# Patient Record
Sex: FEMALE | Race: WHITE | URBAN - METROPOLITAN AREA
[De-identification: names, ages, dates, MRNs, and addresses within clinical notes are randomized per-mention and may not be internally consistent; named-entity substitution may affect disease eponyms.]

---

## 2017-05-09 ENCOUNTER — APPOINTMENT (OUTPATIENT)
Dept: GENERAL RADIOLOGY | Age: 37
DRG: 897 | End: 2017-05-09
Attending: FAMILY MEDICINE
Payer: SELF-PAY

## 2017-05-09 ENCOUNTER — HOSPITAL ENCOUNTER (INPATIENT)
Age: 37
LOS: 3 days | Discharge: HOME | DRG: 897 | End: 2017-05-12
Attending: PSYCHIATRY & NEUROLOGY | Admitting: PSYCHIATRY & NEUROLOGY
Payer: SELF-PAY

## 2017-05-09 PROBLEM — F31.9 BIPOLAR DISORDER (HCC): Status: ACTIVE | Noted: 2017-05-09

## 2017-05-09 PROBLEM — F19.20 POLYSUBSTANCE DEPENDENCE (HCC): Status: ACTIVE | Noted: 2017-05-09

## 2017-05-09 PROCEDURE — 74011250637 HC RX REV CODE- 250/637: Performed by: PSYCHIATRY & NEUROLOGY

## 2017-05-09 PROCEDURE — 71010 XR CHEST PORT: CPT

## 2017-05-09 PROCEDURE — HZ2ZZZZ DETOXIFICATION SERVICES FOR SUBSTANCE ABUSE TREATMENT: ICD-10-PCS | Performed by: PSYCHIATRY & NEUROLOGY

## 2017-05-09 PROCEDURE — 65220000003 HC RM SEMIPRIVATE PSYCH

## 2017-05-09 RX ORDER — OLANZAPINE 5 MG/1
5 TABLET ORAL
Status: DISCONTINUED | OUTPATIENT
Start: 2017-05-09 | End: 2017-05-12 | Stop reason: HOSPADM

## 2017-05-09 RX ORDER — LANOLIN ALCOHOL/MO/W.PET/CERES
100 CREAM (GRAM) TOPICAL DAILY
Status: DISCONTINUED | OUTPATIENT
Start: 2017-05-09 | End: 2017-05-12 | Stop reason: HOSPADM

## 2017-05-09 RX ORDER — ACETAMINOPHEN 325 MG/1
650 TABLET ORAL
Status: DISCONTINUED | OUTPATIENT
Start: 2017-05-09 | End: 2017-05-12 | Stop reason: HOSPADM

## 2017-05-09 RX ORDER — METHADONE HYDROCHLORIDE 10 MG/1
30 TABLET ORAL ONCE
Status: DISCONTINUED | OUTPATIENT
Start: 2017-05-09 | End: 2017-05-09 | Stop reason: SDUPTHER

## 2017-05-09 RX ORDER — DICYCLOMINE HYDROCHLORIDE 10 MG/ML
20 INJECTION INTRAMUSCULAR
Status: DISCONTINUED | OUTPATIENT
Start: 2017-05-09 | End: 2017-05-11

## 2017-05-09 RX ORDER — IBUPROFEN 400 MG/1
400 TABLET ORAL
Status: DISCONTINUED | OUTPATIENT
Start: 2017-05-09 | End: 2017-05-12 | Stop reason: HOSPADM

## 2017-05-09 RX ORDER — LORAZEPAM 1 MG/1
1 TABLET ORAL
Status: DISCONTINUED | OUTPATIENT
Start: 2017-05-09 | End: 2017-05-09

## 2017-05-09 RX ORDER — PROMETHAZINE HYDROCHLORIDE 25 MG/1
25 TABLET ORAL
Status: DISCONTINUED | OUTPATIENT
Start: 2017-05-09 | End: 2017-05-11

## 2017-05-09 RX ORDER — IBUPROFEN 200 MG
1 TABLET ORAL
Status: DISCONTINUED | OUTPATIENT
Start: 2017-05-09 | End: 2017-05-12 | Stop reason: HOSPADM

## 2017-05-09 RX ORDER — METHADONE HYDROCHLORIDE 10 MG/1
15 TABLET ORAL DAILY
Status: COMPLETED | OUTPATIENT
Start: 2017-05-10 | End: 2017-05-11

## 2017-05-09 RX ORDER — ADHESIVE BANDAGE
30 BANDAGE TOPICAL DAILY PRN
Status: DISCONTINUED | OUTPATIENT
Start: 2017-05-09 | End: 2017-05-12 | Stop reason: HOSPADM

## 2017-05-09 RX ORDER — METHOCARBAMOL 500 MG/1
500 TABLET, FILM COATED ORAL
Status: DISCONTINUED | OUTPATIENT
Start: 2017-05-09 | End: 2017-05-11

## 2017-05-09 RX ORDER — HYDROXYZINE 50 MG/1
50 TABLET, FILM COATED ORAL
Status: DISCONTINUED | OUTPATIENT
Start: 2017-05-09 | End: 2017-05-12 | Stop reason: HOSPADM

## 2017-05-09 RX ORDER — BENZTROPINE MESYLATE 1 MG/ML
2 INJECTION INTRAMUSCULAR; INTRAVENOUS
Status: DISCONTINUED | OUTPATIENT
Start: 2017-05-09 | End: 2017-05-12 | Stop reason: HOSPADM

## 2017-05-09 RX ORDER — METHADONE HYDROCHLORIDE 10 MG/1
15 TABLET ORAL DAILY
Status: DISCONTINUED | OUTPATIENT
Start: 2017-05-10 | End: 2017-05-09 | Stop reason: SDUPTHER

## 2017-05-09 RX ORDER — LORAZEPAM 2 MG/ML
4 INJECTION INTRAMUSCULAR
Status: DISCONTINUED | OUTPATIENT
Start: 2017-05-09 | End: 2017-05-11

## 2017-05-09 RX ORDER — ZOLPIDEM TARTRATE 10 MG/1
10 TABLET ORAL
Status: DISCONTINUED | OUTPATIENT
Start: 2017-05-09 | End: 2017-05-12 | Stop reason: HOSPADM

## 2017-05-09 RX ORDER — FOLIC ACID 1 MG/1
1 TABLET ORAL DAILY
Status: DISCONTINUED | OUTPATIENT
Start: 2017-05-09 | End: 2017-05-12 | Stop reason: HOSPADM

## 2017-05-09 RX ORDER — ACETAMINOPHEN 325 MG/1
650 TABLET ORAL
Status: DISCONTINUED | OUTPATIENT
Start: 2017-05-09 | End: 2017-05-09

## 2017-05-09 RX ORDER — LORAZEPAM 2 MG/ML
2 INJECTION INTRAMUSCULAR
Status: DISCONTINUED | OUTPATIENT
Start: 2017-05-09 | End: 2017-05-11

## 2017-05-09 RX ORDER — BENZTROPINE MESYLATE 2 MG/1
2 TABLET ORAL
Status: DISCONTINUED | OUTPATIENT
Start: 2017-05-09 | End: 2017-05-12 | Stop reason: HOSPADM

## 2017-05-09 RX ORDER — LORAZEPAM 2 MG/ML
2 INJECTION INTRAMUSCULAR
Status: DISCONTINUED | OUTPATIENT
Start: 2017-05-09 | End: 2017-05-12 | Stop reason: HOSPADM

## 2017-05-09 RX ORDER — METHADONE HYDROCHLORIDE 10 MG/1
30 TABLET ORAL ONCE
Status: COMPLETED | OUTPATIENT
Start: 2017-05-09 | End: 2017-05-09

## 2017-05-09 RX ORDER — LOPERAMIDE HYDROCHLORIDE 2 MG/1
2 CAPSULE ORAL AS NEEDED
Status: DISCONTINUED | OUTPATIENT
Start: 2017-05-09 | End: 2017-05-12 | Stop reason: HOSPADM

## 2017-05-09 RX ADMIN — Medication 100 MG: at 12:59

## 2017-05-09 RX ADMIN — METHADONE HYDROCHLORIDE 30 MG: 10 TABLET ORAL at 12:59

## 2017-05-09 RX ADMIN — FOLIC ACID 1 MG: 1 TABLET ORAL at 12:59

## 2017-05-09 NOTE — IP AVS SNAPSHOT
Current Discharge Medication List  
  
START taking these medications Dose & Instructions Dispensing Information Comments Morning Noon Evening Bedtime  
 hydrOXYzine HCl 50 mg tablet Commonly known as:  ATARAX Your last dose was: Your next dose is:    
   
   
 Dose:  50 mg Take 1 Tab by mouth every four (4) hours as needed (Anxiety) for up to 10 days. Indications: anxiety Quantity:  90 Tab Refills:  9 Where to Get Your Medications Information on where to get these meds will be given to you by the nurse or doctor. ! Ask your nurse or doctor about these medications  
  hydrOXYzine HCl 50 mg tablet

## 2017-05-09 NOTE — BH NOTES
GROUP THERAPY PROGRESS NOTE    Yuri Scruggs did not participated in an Afternoon Process Group on the Acute Unit with a focus identifying feelings, planning for the day, and learning more about DBT concepts on \"Distress Regulation. 

## 2017-05-09 NOTE — BH NOTES
0 staff approaches pt about stat blood work ordered by dr. Ephraim Alvarado states in a slightly irritable tone \" I don't want to be poked and stuck again! They got all the blood work they need from [facility]". Pt is refusing to have lab work drawn.

## 2017-05-09 NOTE — IP AVS SNAPSHOT
[address] [facility] 
[phone] Patient: Angeles Vázquez MRN: [id number] LAMIN:[birth year] You are allergic to the following Allergen Reactions Celexa (Citalopram) Unknown (comments) Recent Documentation Breastfeeding? No       
  
  
 
  
  
About your hospitalization You were admitted on:  May 9, 2017 You last received care in the:  [facility] You were discharged on:  May 12, 2017 Unit phone number:  [phone] Why you were hospitalized Your primary diagnosis was:  Bipolar Disorder (Hcc) Your diagnoses also included:  Polysubstance Dependence (Hcc) Providers Seen During Your Hospitalizations Provider Role Specialty Primary office phone Marcel Grubbs MD Attending Provider Psychiatry [phone] Vidhi Johnson MD Attending Provider Psychiatry [phone] Your Primary Care Physician (PCP) Primary Care Physician Office Phone Office Fax UNKNOWN, PROVIDER ** None ** ** None ** Follow-up Information Follow up With Details Comments Contact Info [address] [place] [address]  On 5/15/2017 you need to walk in Monday - Friday 8:30 to 3:00 for intake  [address] [place] [address] 15  
[phone]  
fax [phone] Provider Unknown   Patient not available to ask Current Discharge Medication List  
  
START taking these medications Dose & Instructions Dispensing Information Comments Morning Noon Evening Bedtime  
 hydrOXYzine HCl 50 mg tablet Commonly known as:  ATARAX Your last dose was: Your next dose is:    
   
   
 Dose:  50 mg Take 1 Tab by mouth every four (4) hours as needed (Anxiety) for up to 10 days. Indications: anxiety Quantity:  90 Tab Refills:  9 Where to Get Your Medications Information on where to get these meds will be given to you by the nurse or doctor. ! Ask your nurse or doctor about these medications  
  hydrOXYzine HCl 50 mg tablet Discharge Instructions DISCHARGE SUMMARY 
 
Juan Douglass : 1980 MRN: [id number] The patient Tom Cisneros exhibits the ability to control behavior in a less restrictive environment. Patient's level of functioning is improving. No assaultive/destructive behavior has been observed for the past 24 hours. No suicidal/homicidal threat or behavior has been observed for the past 24 hours. There is no evidence of serious medication side effects. Patient has not been in physical or protective restraints for at least the past 24 hours. If weapons involved, how are they secured? No weapons involved Is patient aware of and in agreement with discharge plan? Patient is aware of discharge and is in agreement Arrangements for medication: . Patient is a smoker and needs referral for smoking cessation? Patient declined 1. Patient referred to the following for smoking cessation with an appointment: Patient declined 2. Patient was offered medication to assist with smoking cessation at discharge: Patient declined 3. Education for smoking cessation added to discharge instructions:  
 
Patient has a history of substance/alcohol abuse and requires a referral for treatment ? yes 1. Patient referred to the following for substance/alcohol abuse treatment with an appointment:   Friday May 15 at 8:30 with [facility] 2. Patient was offered medication to assist with alcohol cessation at discharge:  no 
3. Education for substance/alcohol abuse added to discharge instructions:  
 
Copy of discharge instructions to  provider?:  Yes, faxed to [phone] Arrangements for transportation home:  Hospital purchase a bus ticket Psychiatric Advanced Care Directives- no  
 Name of Decision maker if patient has Psychiatric Care Directive Patient was offered information  -  patient declined information. Keep all follow up appointments as scheduled, continue to take prescribed medications per physician instructions. Mental health crisis number:  842 or your local mental health crisis line number at [phone] Discharge Orders None [employer] Announcement We are excited to announce that we are making your provider's discharge notes available to you in [employer]. You will see these notes when they are completed and signed by the physician that discharged you from your recent hospital stay. If you have any questions or concerns about any information you see in [employer], please call the Health Information Department where you were seen or reach out to your Primary Care Provider for more information about your plan of care. Introducing [facility]! Gracie Bryson introduces [employer] patient portal. Now you can access parts of your medical record, email your doctor's office, and request medication refills online. 1. In your internet browser, go to [email or web address] 2. Click on the First Time User? Click Here link in the Sign In box. You will see the New Member Sign Up page. 3. Enter your [employer] Access Code exactly as it appears below. You will not need to use this code after youve completed the sign-up process. If you do not sign up before the expiration date, you must request a new code. · [employer] Access Code: [email or web address] Expires: 8/7/2017  5:58 AM 
 
4. Enter the last four digits of your Social Security Number (xxxx) and Date of Birth (mm/dd/yyyy) as indicated and click Submit. You will be taken to the next sign-up page. 5. Create a [employer] ID. This will be your [employer] login ID and cannot be changed, so think of one that is secure and easy to remember. 6. Create a [employer] password. You can change your password at any time. 7. Enter your Password Reset Question and Answer. This can be used at a later time if you forget your password. 8. Enter your e-mail address. You will receive e-mail notification when new information is available in [address] 9. Click Sign Up. You can now view and download portions of your medical record. 10. Click the Download Summary menu link to download a portable copy of your medical information. If you have questions, please visit the Frequently Asked Questions section of the [employer] website. Remember, [employer] is NOT to be used for urgent needs. For medical emergencies, dial 911. Now available from your iPhone and Android! General Information Please provide this summary of care documentation to your next provider. Patient Signature:  ____________________________________________________________ Date:  ____________________________________________________________  
  
Yvette Cruz Provider Signature:  ____________________________________________________________ Date:  ____________________________________________________________

## 2017-05-09 NOTE — BH NOTES
PSYCHOSOCIAL ASSESSMENT  :Patient identifying info: Zak Miller is a 29 y.o., female admitted 2017  5:59 AM     Presenting problem and precipitating factors: Patent was admitted due to suicidal ideations and she was abusing substance . Current psychiatric providers and contact info: no current provider   Previous psychiatric services/providers and response to treatment: no previous admissions       Substance abuse history:    Social History   Substance Use Topics    Smoking status: Not on file    Smokeless tobacco: Not on file    Alcohol use Not on file       Family constellation: boyfriend ,   Lost of custody of 3 children ( 2 girls, one son )     Is significant other involved?        Describe support system:     Describe living arrangements and home environment:lives at home with her boyfriend   Health issues:   Hospital Problems  Never Reviewed          Codes Class Noted POA    Bipolar disorder [facility]) ICD-10-CM: F31.9  ICD-9-CM: 296.80  2017 Unknown              Trauma history: none noted     Legal issues: no    History of  service: no    Financial status: not working     Catholic/cultural factors: none noted     Education/work history: some college     Have you been licensed as a philip care professional (current or ): no  Leisure and recreation preferences: unknown  Describe coping skills:ineffectual     Grady Mays  2017

## 2017-05-09 NOTE — PROGRESS NOTES
Admission Medication Reconciliation:    Information obtained from: Patient,     Significant PMH/Disease States:   No past medical history on file. Allergies:  Celexa [citalopram]    Prior to Admission Medications:   None         Comments/Recommendations: Patient was not taking any prescribed medications prior to admission.        Burgess Day, PharmD, [facility], BCPS

## 2017-05-09 NOTE — BH NOTES
TRANSFER - IN REPORT:    Verbal report received from Zara Child (name) on Bula Side  being received from ED, [facility] [place] (unit) for routine progression of care      Report consisted of patients Situation, Background, Assessment and   Recommendations(SBAR). Information from the following report(s) ED Summary was reviewed with the receiving nurse. Opportunity for questions and clarification was provided. Assessment completed upon patients arrival to unit and care assumed.

## 2017-05-09 NOTE — PROGRESS NOTES
[address] [facility] 60  Master Treatment Plan for Kathy Sinclair    Date Treatment Plan Initiated: 5/9/2017    Treatment Plan Modalities:  Type of Modality Amount  (x minutes) Frequency (x/week) Duration (x days) Name of Responsible Staff   Community & wrap-up meetings to encourage peer interactions         Group psychotherapy to assist in building coping skills and internal controls       Therapeutic activity groups to build coping skills       Psychoeducation in group setting to address:   Medication education         Coping skills         Relaxation techniques         Symptom management         Discharge planning         Spirituality          CHRIS         Recovery/AA/NA         Physician medication management         Family meeting/discharge planning                                        Problem: Manic Behavior (Adult/Pediatric)  Goal: *STG: Participates in treatment plan  Outcome: Progressing Towards Goal  Pt. Met with Dr. Vilma Conrad    Goal: *STG: Maintains appropriate boundaries  Outcome: Progressing Towards Goal  Pt. Maintaining Appropriate  boundaries  Goal: *STG/LTG: Complies with medication therapy  Outcome: Progressing Towards Goal  Pt. Medication compliant  Goal: Interventions  Outcome: Progressing Towards Goal  Will continue to monitor  On 15 min.  Checks for safety  Assess suicidal ideation    Medication effectiveness

## 2017-05-09 NOTE — H&P
[address] [facility] [address]   HISTORY AND PHYSICAL       Name:  Mendy Boswell   MR#:  [id number]   :  1980   Account #:  [de-identified]        Date of Adm:  2017       ATTENDING PHYSICIAN: Nicole Clark MD    CHIEF COMPLAINT: Suicidal ideation. HISTORY OF PRESENT ILLNESS: The patient is a 29-year-old   female with past medical history significant for bipolar disorder, manic   depression and anxiety, who presented to the ED in [place] with   complaints of having racing thoughts and severe difficulty focusing. The patient had suicidal ideation, but denied any significant auditory or   visual hallucinations. She states that at night she notices . She also   reports that she is undergoing some issues with the custody of her   kids, and just wanted to harm herself and slit her left wrist. The patient   was evaluated and was transferred to the hospital here to psych zaragoza   for further management and evaluation. Currently, the patient is resting   in bed. Denies any complaints or problems. Denies any suicidal   ideation. Denies any headache, blurry vision, sore throat, trouble   swallowing, trouble with speech, any chest pain, shortness of breath,   cough, fever, chills, abdominal pain, constipation, diarrhea,   hematemesis, melena, hemoptysis, urinary symptoms, focal or   generalized neurological weakness, recent travels or sick contacts. PAST MEDICAL HISTORY: See above. HOME MEDICATIONS: None. SOCIAL HISTORY: The patient has history of drug abuse, was found   to have a urine drug screen positive for marijuana, benzos,   barbiturates, opiates, methamphetamine and cocaine. Denies tobacco   abuse or alcohol use. ALLERGIES TO: Ardie Brunette. FAMILY HISTORY: Was discussed, was found to be noncontributory. PHYSICAL EXAMINATION   VITAL SIGNS: Temperature 97.5, , respiratory rate 16, blood   pressure , pulse oximetry 98% on room air.    GENERAL: Alert x3, flat affect, resting in bed, pleasant female,   appears to be stated age. HEENT: Pupils equal and reactive to light. Dry mucous membranes. There are areas of superficial bruising, but no pain associated with the   same on the face. NECK: Supple. CHEST: Clear to auscultation bilaterally. CORONARY: S1, S2 were heard. ABDOMEN: Soft, nontender, nondistended. Bowel sounds physiologic. EXTREMITIES: No clubbing, no cyanosis, no edema. NEUROPSYCHIATRIC: Flat affect. Moves all 4. Strength 5/5. No gross   focal neurological deficits were noted. LABORATORY DATA: Labs done at [facility] show hemoglobin 13.4, hematocrit 37.3, platelets of 718. UA   with no acute infection. Sodium 136, potassium 3.8, chloride 112,   bicarbonate 21, anion gap 10, glucose 76, BUN 12, creatinine 0.7,   calcium 9.1, albumin 4.1, total bilirubin 1.1, , ALT 12. ASSESSMENT AND PLAN   1. Suicidal ideation. The patient has been admitted to the psych floor,   and will follow along with Psychiatry. Continue to monitor. Plan per   primary. 2. Mild elevation in bilirubin. Will recheck blood levels. The patient   denies any complaints or problems at this point of time. 3. Gastrointestinal and deep venous thrombosis prophylaxis per unit   protocol.         Rodrigue Mcknight MD       / [facility]   D:  05/09/2017   15:43   T:  05/09/2017   16:08   Job #:  [id number]

## 2017-05-09 NOTE — PROGRESS NOTES
Problem: Manic Behavior (Adult/Pediatric)  Goal: *STG: Maintains appropriate boundaries  Outcome: Progressing Towards Goal  Pt. Maintaining appropriate boundaries  Goal: Interventions  Outcome: Progressing Towards Goal  Will continue to monitor  Offer verbal re-direction as indicated    Problem: Chemical Dependency (Adult/Pediatric)  Goal: *STG: Participates in treatment plan  Outcome: Progressing Towards Goal  Pt. Met with Dr. Barry Santana in treatment team  Discussed stressors  Increased use of substances    Meth  Amphetamines  Cocaine  thc    Placed on COW  Score 11  Planning methadone taper  Goal: *STG: Seeks staff when symptoms of withdrawal increase  Outcome: Progressing Towards Goal  Vital signs stable     Within normal limits   Goal: *STG: Attends activities and groups  Variance: Patient Condition  Not participating in groups  Goal: Interventions  Outcome: Progressing Towards Goal  Will continue to monitor  On 15 min.   Checks for safety    Assess  COW  Withdrawal symptoms  Medication effectiveness   Encourage group participaion    [address] [facility] 60  Master Treatment Plan for Bula Side    Date Treatment Plan Initiated: 5/9/2017    Treatment Plan Modalities:  Type of Modality Amount  (x minutes) Frequency (x/week) Duration (x days) Name of Responsible Staff   [address] [place] [address] meetings to encourage peer interactions 15 7 [facility]    Group psychotherapy to assist in building coping skills and internal controls 60 7 [address] LCS[facility]   Therapeutic activity groups to build coping skills 60 7 1 Will KENYONW   Psychoeducation in group setting to address:   Medication education   15  7 1 Placido Bueno RN    Coping skills   20 1 1 Sarita Morales RN   Relaxation techniques         Symptom management   20  1 1 [address]   Discharge planning   15 7 1 [address] [place] [address] Pkwy work   Spirituality          CHRIS         Recovery/AA/NA         Physician medication management   15 1 1 Dr. Vira Hansen

## 2017-05-09 NOTE — IP AVS SNAPSHOT
Summary of Care Report The Summary of Care report has been created to help improve care coordination. Users with access to [employer] or [address] (Web-based application) may access additional patient information including the Discharge Summary. If you are not currently a [address] user and need more information, please call the number listed below in the [place] [address] section and ask to be connected with Medical Records. Facility Information Name Address Phone [phone] [place] [phone] Patient Information Patient Name Sex RAJNI Clifford ([id number]) Female 1980 Discharge Information Admitting Provider Service Area Unit Flako Palmer MD / [phone] [address] / [phone] Discharge Provider Discharge Date/Time Discharge Disposition Destination (none) 2017 (Pending) AHR (none) Patient Language Language ENGLISH [13] Hospital Problems as of 2017  Never Reviewed Class Noted - Resolved Last Modified POA Active Problems * (Principal)Bipolar disorder ([facility] [address]  2017 - Present 2017 by Barry Cates MD Unknown Entered by Flako Palmer MD  
  Polysubstance dependence [facility])  2017 - Present 2017 by Barry Cates MD Unknown Entered by Barry Cates MD  
  
You are allergic to the following Allergen Reactions Celexa (Citalopram) Unknown (comments) Current Discharge Medication List  
  
START taking these medications Dose & Instructions Dispensing Information Comments  
 hydrOXYzine HCl 50 mg tablet Commonly known as:  ATARAX Dose:  50 mg Take 1 Tab by mouth every four (4) hours as needed (Anxiety) for up to 10 days. Indications: anxiety Quantity:  90 Tab Refills:  9 Follow-up Information Follow up With Details Comments Contact Info [facility]  On 5/15/2017 you need to walk in Monday - Friday 8:30 to 3:00 for intake  [address] [facility] [place] , [address]  
[phone]  
fax [phone] Provider Unknown   Patient not available to ask Discharge Instructions DISCHARGE SUMMARY 
 
Danilo Bejarano : 1980 MRN: [id number] The patient Mina Kowalski exhibits the ability to control behavior in a less restrictive environment. Patient's level of functioning is improving. No assaultive/destructive behavior has been observed for the past 24 hours. No suicidal/homicidal threat or behavior has been observed for the past 24 hours. There is no evidence of serious medication side effects. Patient has not been in physical or protective restraints for at least the past 24 hours. If weapons involved, how are they secured? No weapons involved Is patient aware of and in agreement with discharge plan? Patient is aware of discharge and is in agreement Arrangements for medication: . Patient is a smoker and needs referral for smoking cessation? Patient declined 1. Patient referred to the following for smoking cessation with an appointment: Patient declined 2. Patient was offered medication to assist with smoking cessation at discharge: Patient declined 3. Education for smoking cessation added to discharge instructions:  
 
Patient has a history of substance/alcohol abuse and requires a referral for treatment ? yes 1. Patient referred to the following for substance/alcohol abuse treatment with an appointment:   Friday May [facility] 2. Patient was offered medication to assist with alcohol cessation at discharge:  no 
3. Education for substance/alcohol abuse added to discharge instructions:  
 
Copy of discharge instructions to  provider?:  Yes, faxed to [phone] Arrangements for transportation home:  Hospital purchase a bus ticket Psychiatric Advanced Care Directives- no  
Name of Decision maker if patient has Psychiatric Care Directive Patient was offered information  -  patient declined information. Keep all follow up appointments as scheduled, continue to take prescribed medications per physician instructions. Mental health crisis number:  016 or your local mental health crisis line number at [phone] Chart Review Routing History No Routing History on File

## 2017-05-09 NOTE — H&P
INITIAL PSYCHIATRIC EVALUATION         IDENTIFICATION:    Patient Name  Blanca Franks   Date of Birth 1980   [facility] [id number]   Medical Record Number  [id number]      Age  29 y.o. PCP PROVIDER UNKNOWN   Admit date:  5/9/2017    Room Number  730/01  @ [facility]   Date of Service  5/9/2017            HISTORY         REASON FOR HOSPITALIZATION:  CC: \"SI and depression \". Pt admitted under a voluntary basis for severe depression with suicidal ideations  proving to be/posing an imminent danger to self and an inability to care for self. HISTORY OF PRESENT ILLNESS:    The patient, Blanca Franks, is a 29 y.o. UNKNOWN female with a past psychiatric history significant for polysubstance depndence, who presents at this time with complaints of (and/or evidence of) the following emotional symptoms: agitation. Additional symptomatology include feeling suicidal.  The above symptoms have been present for 2 days . These symptoms are of severe severity. These symptoms are constant in nature. The patient's condition has been precipitated by loosing custody or her children and psychosocial stressors (conflicts with the childerns' father ). Patient's condition made worse by continued illicit drug use and alcohol use as well as treatment noncompliance. UDS: +MJ, BZP, BARBS, OPI, METHAMPH, AMPH, Cocaineand PCP BAL=0. ALLERGIES:  Allergies   Allergen Reactions    Celexa [Citalopram] Unknown (comments)      MEDICATIONS PRIOR TO ADMISSION:  No prescriptions prior to admission. PAST MEDICAL HISTORY:  No past medical history on file. No past surgical history on file. SOCIAL HISTORY:    Social History     Social History    Marital status: UNKNOWN     Spouse name: N/A    Number of children: N/A    Years of education: N/A     Occupational History    Not on file.      Social History Main Topics    Smoking status: Not on file    Smokeless tobacco: Not on file    Alcohol use Not on file    Drug use: Not on file    Sexual activity: Not on file     Other Topics Concern    Not on file     Social History Narrative    29year old  female admitted voluntarily for severe depression in the context of polysubstance dependence and loss of custody of her 3 children. Patient and her childrens' father have been fighting the legal system for 4 years now, in order to regain custody of their children, which CPS has removed from their home. The pat. And her boyfriend, have been unable to sustain gainful employment not to abstain from illicit drugs. The Pt's UDS was pan positive for methamphetamines, THC, Opiates, barbiturates, cocaine, and benzodiazepines. Pt reported to the ED after an argument with her chidrens' father, after which she attempted suicide bt cutting he left wrist, requiring sutures. She denies any past hx of psychiatric treatment. FAMILY HISTORY:   No family history on file. REVIEW OF SYSTEMS:   Psychological ROS: positive for - behavioral disorder  Respiratory ROS: no cough, shortness of breath, or wheezing  Cardiovascular ROS: no chest pain or dyspnea on exertion  Pertinent items are noted in the History of Present Illness. All other Systems reviewed and are considered negative. MENTAL STATUS EXAM & VITALS         MENTAL STATUS EXAM (MSE):    MSE FINDINGS ARE WITHIN NORMAL LIMITS (WNL) UNLESS OTHERWISE STATED BELOW. ( ALL OF THE BELOW CATEGORIES OF THE MSE HAVE BEEN REVIEWED (reviewed 5/9/2017) AND UPDATED AS DEEMED APPROPRIATE )  General Presentation age appropriate, evasive and guarded   Orientation oriented to time, place and person   Vital Signs  See below (reviewed 5/9/2017); Vital Signs (BP, Pulse, & Temp) are within normal limits if not listed below.    Gait and Station Stable/steady, no ataxia   Musculoskeletal System No extrapyramidal symptoms (EPS); no abnormal muscular movements or Tardive Dyskinesia (TD); muscle strength and tone are within normal limits   Language No aphasia or dysarthria   Speech:  normal pitch   Thought Processes logical; normal rate of thoughts; poor abstract reasoning/computation   Thought Associations circumstantial   Thought Content free of delusions   Suicidal Ideations Cut left wrist requiring sutures   Homicidal Ideations none   Mood:  irritable   Affect:  constricted   Memory recent  fair   Memory remote:  fair   Concentration/Attention:  distractable   Fund of Knowledge below average   Insight:  poor   Reliability poor   Judgment:  poor            VITALS:     Patient Vitals for the past 24 hrs:   Temp Pulse Resp BP SpO2   05/09/17 1200 97.4 °F (36.3 °C) 84 16 125/85 98 %   05/09/17 0728 98.4 °F (36.9 °C) 81 16 113/78 99 %   05/09/17 0600 98.1 °F (36.7 °C) 93 18 130/89 100 %     Wt Readings from Last 3 Encounters:   No data found for Wt     Temp Readings from Last 3 Encounters:   05/09/17 97.4 °F (36.3 °C)     BP Readings from Last 3 Encounters:   05/09/17 125/85     Pulse Readings from Last 3 Encounters:   05/09/17 84            DATA       LABORATORY DATA:  Labs Reviewed - No data to display  No results found for any previous visit. RADIOLOGY REPORTS:  No results found for this or any previous visit. No results found.            MEDICATIONS       ALL MEDICATIONS  Current Facility-Administered Medications   Medication Dose Route Frequency    ziprasidone (GEODON) 20 mg in sterile water (preservative free) 1 mL injection  20 mg IntraMUSCular BID PRN    OLANZapine (ZyPREXA) tablet 5 mg  5 mg Oral Q6H PRN    benztropine (COGENTIN) tablet 2 mg  2 mg Oral BID PRN    benztropine (COGENTIN) injection 2 mg  2 mg IntraMUSCular BID PRN    LORazepam (ATIVAN) injection 2 mg  2 mg IntraMUSCular Q4H PRN    zolpidem (AMBIEN) tablet 10 mg  10 mg Oral QHS PRN    acetaminophen (TYLENOL) tablet 650 mg  650 mg Oral Q4H PRN    ibuprofen (MOTRIN) tablet 400 mg  400 mg Oral Q8H PRN    magnesium hydroxide (MILK OF MAGNESIA) 400 mg/5 mL oral suspension 30 mL  30 mL Oral DAILY PRN    nicotine (NICODERM CQ) 21 mg/24 hr patch 1 Patch  1 Patch TransDERmal DAILY PRN    folic acid (FOLVITE) tablet 1 mg  1 mg Oral DAILY    thiamine (B-1) tablet 100 mg  100 mg Oral DAILY    LORazepam (ATIVAN) injection 2 mg  2 mg IntraMUSCular Q1H PRN    LORazepam (ATIVAN) injection 4 mg  4 mg IntraMUSCular Q1H PRN    [START ON 5/10/2017] methadone (DOLOPHINE) tablet 15 mg  15 mg Oral DAILY    promethazine (PHENERGAN) tablet 25 mg  25 mg Oral Q6H PRN    loperamide (IMODIUM) capsule 2 mg  2 mg Oral PRN    dicyclomine (BENTYL) 10 mg/mL injection 20 mg  20 mg IntraMUSCular Q6H PRN    hydrOXYzine HCl (ATARAX) tablet 50 mg  50 mg Oral Q4H PRN    methocarbamol (ROBAXIN) tablet 500 mg  500 mg Oral BID PRN      SCHEDULED MEDICATIONS  Current Facility-Administered Medications   Medication Dose Route Frequency    folic acid (FOLVITE) tablet 1 mg  1 mg Oral DAILY    thiamine (B-1) tablet 100 mg  100 mg Oral DAILY    [START ON 5/10/2017] methadone (DOLOPHINE) tablet 15 mg  15 mg Oral DAILY                ASSESSMENT & PLAN        The patient Tom Cisneros is a 29 y.o.  female who presents at this time for treatment of the following diagnoses:  Patient Active Hospital Problem List:   Bipolar disorder ([facility] (5/9/2017)    Assessment: by history. No evidence of hypomania or jorge on admission    Plan: Mental state is quite likely due to protracted and severe polyunbstance dependence   Polysubstance dependence ([facility] (5/9/2017)    Assessment ;daily use of severeal illicit substances with tolerance and withdrwal    Plan: methadone taper, CIWA w/ prm ativan          In summary, Tom Cisneros presents with a severe exacerbation of the principal diagnosis, Bipolar disorder ([facility]    While on the unit Tom Cisneros will be provided with individual, milieu, occupational, group, and substance abuse therapies to address target symptoms as deemed appropriate for the individual patient.     ill continue to monitor blood levels (Depakote, Tegretol, lithium, clozapine---a drug with a narrow therapeutic index= NTI) and associated labs for drug therapy implemented that require intense monitoring for toxicity as deemed appropriate base on current medication side effects and pharmacodynamically determined drug 1/2 lives. I agree with decision to admit patient. I have spoken to [facility] psychiatric /ED staff regarding the nature of patients's admission at this time. A coordinated, multidisplinary treatment team (includes the nurse, unit pharmcist,  and writer) round was conducted for this initial evaluation with the patient present. The following regarding medications was addressed during rounds with patient:   the risks and benefits of the proposed medication. The patient was given the opportunity to ask questions. Informed consent given to the use of the above medications. I will continue to adjust psychiatric and non-psychiatric medications (see above \"medication\" section and orders section for details) as deemed appropriate & based upon diagnoses and response to treatment. I have reviewed admission (and previous/old) labs and medical tests in the EHR and or transferring hospital documents. I will continue to order blood tests/labs and diagnostic tests as deemed appropriate and review results as they become available (see orders for details). I have reviewed old psychiatric and medical records available in the EHR. I Will order additional psychiatric records from other institutions to further elucidate the nature of patient's psychopathology and review once available. I will gather additional collateral information from friends, family and o/p treatment team to further elucidate the nature of patient's psychopathology and baselline level of psychiatric functioning.         ESTIMATED LENGTH OF STAY:    3-5 days       STRENGTHS:  Awareness of Substance abuse issues and Motivated and ready for change                      SIGNED:    Cruz Heimlich, MD  5/9/2017

## 2017-05-09 NOTE — BH NOTES
Primary Nurse Duong Campa RN and J Carlos Arora RN performed a dual skin assessment on this patient Impairment noted- Cut left wrist, bruise on right shoulder and face. Scratches on face. Tattoo on back. Trevin score is 23    Patient arrived on the unit at 0600. She arrived on a stretcher with [facility]. She stated she was very tired and didn't want to do anything but go to bed.

## 2017-05-10 LAB
ALBUMIN SERPL BCP-MCNC: 3.5 G/DL (ref 3.5–5)
ALBUMIN/GLOB SERPL: 1 {RATIO} (ref 1.1–2.2)
ALP SERPL-CCNC: 74 U/L (ref 45–117)
ALT SERPL-CCNC: 16 U/L (ref 12–78)
ANION GAP BLD CALC-SCNC: 5 MMOL/L (ref 5–15)
AST SERPL W P-5'-P-CCNC: 8 U/L (ref 15–37)
BILIRUB SERPL-MCNC: 0.8 MG/DL (ref 0.2–1)
BUN SERPL-MCNC: 14 MG/DL (ref 6–20)
BUN/CREAT SERPL: 20 (ref 12–20)
CALCIUM SERPL-MCNC: 9.1 MG/DL (ref 8.5–10.1)
CHLORIDE SERPL-SCNC: 101 MMOL/L (ref 97–108)
CHOLEST SERPL-MCNC: 131 MG/DL
CO2 SERPL-SCNC: 30 MMOL/L (ref 21–32)
CREAT SERPL-MCNC: 0.69 MG/DL (ref 0.55–1.02)
ERYTHROCYTE [DISTWIDTH] IN BLOOD BY AUTOMATED COUNT: 12.8 % (ref 11.5–14.5)
GLOBULIN SER CALC-MCNC: 3.6 G/DL (ref 2–4)
GLUCOSE P FAST SERPL-MCNC: 82 MG/DL (ref 65–100)
GLUCOSE SERPL-MCNC: 82 MG/DL (ref 65–100)
HCT VFR BLD AUTO: 37.7 % (ref 35–47)
HDLC SERPL-MCNC: 63 MG/DL
HDLC SERPL: 2.1 {RATIO} (ref 0–5)
HGB BLD-MCNC: 12.8 G/DL (ref 11.5–16)
INR PPP: 1 (ref 0.9–1.1)
LDLC SERPL CALC-MCNC: 47.8 MG/DL (ref 0–100)
LIPID PROFILE,FLP: NORMAL
MCH RBC QN AUTO: 30.8 PG (ref 26–34)
MCHC RBC AUTO-ENTMCNC: 34 G/DL (ref 30–36.5)
MCV RBC AUTO: 90.8 FL (ref 80–99)
PLATELET # BLD AUTO: 362 K/UL (ref 150–400)
POTASSIUM SERPL-SCNC: 4.2 MMOL/L (ref 3.5–5.1)
PROT SERPL-MCNC: 7.1 G/DL (ref 6.4–8.2)
PROTHROMBIN TIME: 9.9 SEC (ref 9–11.1)
RBC # BLD AUTO: 4.15 M/UL (ref 3.8–5.2)
SODIUM SERPL-SCNC: 136 MMOL/L (ref 136–145)
TRIGL SERPL-MCNC: 101 MG/DL (ref ?–150)
VLDLC SERPL CALC-MCNC: 20.2 MG/DL
WBC # BLD AUTO: 9.3 K/UL (ref 3.6–11)

## 2017-05-10 PROCEDURE — 65220000003 HC RM SEMIPRIVATE PSYCH

## 2017-05-10 PROCEDURE — 85027 COMPLETE CBC AUTOMATED: CPT | Performed by: FAMILY MEDICINE

## 2017-05-10 PROCEDURE — 36415 COLL VENOUS BLD VENIPUNCTURE: CPT | Performed by: FAMILY MEDICINE

## 2017-05-10 PROCEDURE — 80061 LIPID PANEL: CPT | Performed by: PSYCHIATRY & NEUROLOGY

## 2017-05-10 PROCEDURE — 85610 PROTHROMBIN TIME: CPT | Performed by: FAMILY MEDICINE

## 2017-05-10 PROCEDURE — 82947 ASSAY GLUCOSE BLOOD QUANT: CPT | Performed by: PSYCHIATRY & NEUROLOGY

## 2017-05-10 PROCEDURE — 74011250637 HC RX REV CODE- 250/637: Performed by: PSYCHIATRY & NEUROLOGY

## 2017-05-10 PROCEDURE — 80053 COMPREHEN METABOLIC PANEL: CPT | Performed by: FAMILY MEDICINE

## 2017-05-10 RX ADMIN — Medication 100 MG: at 08:36

## 2017-05-10 RX ADMIN — FOLIC ACID 1 MG: 1 TABLET ORAL at 08:36

## 2017-05-10 RX ADMIN — METHADONE HYDROCHLORIDE 15 MG: 10 TABLET ORAL at 08:36

## 2017-05-10 NOTE — PROGRESS NOTES
Problem: Manic Behavior (Adult/Pediatric)  Goal: *STG: Participates in treatment plan  Outcome: Progressing Towards Goal  Patient has been compliant with medications and participating with her care plan all day today. She is currently sleeping. No stress noted.

## 2017-05-10 NOTE — BH NOTES
GROUP THERAPY PROGRESS NOTE    Ashlie Salinas participated in an Afternoon Process Group on the Acute Unit with a focus identifying feelings, planning for the day, and reviewing concepts of the Language of Empowerment. Group time: 45 minutes. Personal goal for participation: To better understand the language of intention and self-actualization. Goal orientation: The patient will be able to identify the difference between I language that disempowers and I language that empowers. Group therapy participation: With prompting, this patient participated in the group. Therapeutic interventions reviewed and discussed: The group members were asked to introduce themselves to each other and to see if they could identify an emotion they are having and/or let the group know what they want to focus on for the day as they continue to make discharge plans. They were also asked to take turns reading from a handout that provided illustrations of the difference between language that does not hold oneself accountable or partially responsible and language the encourages ownership and engagement. Eleven examples were provided. Three examples: Replace Ill try to with I will or I commit to; Replace I cant with Im not willing to; Replace I have to with I choose to or I want to.  They were also asked to discuss the examples in the handout and how they felt about the recommended changes. The group members were encouraged to keep the handout. Impression of participation: The patient said she thought she was \"feeling slightly better\" and hoped to continue on that path for the rest of the day. She sat and seemed to track the conversation of her peers but was reserved until spoken to. She said her aftercare plans included going to [employer] classes. \" She acknowledged that she was isolating prior to coming into the hospital and that she might benefit from practicing what it takes to reach out and interact with others. \"I know I should avoid isolation. ..it wasn't good for me. \" She added, \"I also have a problem with anger,\" which seemed to be related to trauma, \"bad events that happened to me in the past.\" The patient expressed no SI/HI nor overt psychotic symptoms in group. She appeared depressed and looked tired. This was the first process group the patient has attended with the undersigned.

## 2017-05-10 NOTE — PROGRESS NOTES
Problem: Manic Behavior (Adult/Pediatric)  Goal: *STG: Remains safe in hospital  Outcome: Progressing Towards Goal  Received pt resting. Pt denies SI. Mood is calm at this time. Continue to encourage and educate.

## 2017-05-10 NOTE — INTERDISCIPLINARY ROUNDS
Behavioral Health Interdisciplinary Rounds     Patient Name: Kolton Guzmán  Age: 29 y.o. Room/Bed:  730/01  Primary Diagnosis: Bipolar disorder ([facility]   Admission Status: Voluntary     Readmission within 30 days: no  Power of  in place: no  Patient requires a blocked bed: no          Reason for blocked bed:     VTE Prophylaxis: No  Mobility needs/Fall risk: no    Nutritional Plan: no  Consults:          Labs/Testing due today?: yes    Sleep hours:  7.30      Participation in Care/Groups:  yes  Medication Compliant?: Yes  PRNS (last 24 hours): None    Restraints (last 24 hours):  no  Substance Abuse:  yes  CIWA (range last 24 hours):  COWS (range last 24 hours): 0 to 3  Alcohol screening (AUDIT) completed -  AUDIT Score: 0  If applicable, date SBIRT discussed in treatment team AND documented:   Tobacco - patient is a smoker: no   Date tobacco education completed by RN:   24 hour chart check complete: yes     Patient goal(s) for today:   Treatment team focus/goals: Plan to look into a long term rehab LOS:  0  Expected LOS: TBD   Psychiatric Advanced Care Directives -  no   Name of Decision maker if patient has Psychiatric Care Directive   Patient was offered information   Financial concerns/prescription coverage:  yes  Date of last family contact:       Family requesting physician contact today:  no  Discharge plan: she will return home when ready for discharge        Outpatient provider(s): [facility]     Participating treatment team members: Papa [address]., PharmD.

## 2017-05-10 NOTE — PROGRESS NOTES
Problem: Manic Behavior (Adult/Pediatric)  Goal: *STG: Participates in treatment plan  Outcome: Progressing Towards Goal  Pt. Met in treatment team with Dr. Louie Crockett  No jorge  Or lability   Goal: *STG: Maintains appropriate boundaries  Outcome: Progressing Towards Goal  Pt. Maintaining appropriate boundaries  Goal: *STG/LTG: Complies with medication therapy  Outcome: Progressing Towards Goal  Medication compliant    Problem: Chemical Dependency (Adult/Pediatric)  Goal: *STG: Complies with medication therapy  Outcome: Progressing Towards Goal  Pt. COW 0      Tolerating methadone taper  Goal: Interventions  Outcome: Progressing Towards Goal  Will continue to monitor  On 15 min.  Checks for safety    Assess lability   Medication effectiveness   Encourage groups

## 2017-05-10 NOTE — DISCHARGE INSTRUCTIONS
DISCHARGE SUMMARY    Jose Flower  : 1980  MRN: [id number]    The patient Brady Gallardo exhibits the ability to control behavior in a less restrictive environment. Patient's level of functioning is improving. No assaultive/destructive behavior has been observed for the past 24 hours. No suicidal/homicidal threat or behavior has been observed for the past 24 hours. There is no evidence of serious medication side effects. Patient has not been in physical or protective restraints for at least the past 24 hours. If weapons involved, how are they secured? No weapons involved     Is patient aware of and in agreement with discharge plan? Patient is aware of discharge and is in agreement     Arrangements for medication: . Patient is a smoker and needs referral for smoking cessation? Patient declined   1. Patient referred to the following for smoking cessation with an appointment: Patient declined   2. Patient was offered medication to assist with smoking cessation at discharge: Patient declined   3. Education for smoking cessation added to discharge instructions:     Patient has a history of substance/alcohol abuse and requires a referral for treatment ? yes  1. Patient referred to the following for substance/alcohol abuse treatment with an appointment:   Friday May 15 at 8:30 with [facility]   2. Patient was offered medication to assist with alcohol cessation at discharge:  no  3. Education for substance/alcohol abuse added to discharge instructions:     Copy of discharge instructions to  provider?:  Yes, faxed to [phone]   Arrangements for transportation home:  Hospital purchase a bus ticket  [address]- no   Name of Decision maker if patient has Psychiatric Care Directive   Patient was offered information  -  patient declined information.     Keep all follow up appointments as scheduled, continue to take prescribed medications per physician instructions.   Mental health crisis number:  [phone] or your local mental health crisis line number at [phone]

## 2017-05-10 NOTE — BH NOTES
PSYCHIATRIC PROGRESS NOTE         Patient Name  Aparna Albright   Date of Birth 1980   [facility] [id number]   Medical Record Number  [id number]      Age  29 y.o. PCP PROVIDER UNKNOWN   Admit date:  5/9/2017    Room Number  730/01  @ [facility]   Date of Service  5/10/2017          PSYCHOTHERAPY SESSION NOTE:  Length of psychotherapy session: 45 minutes    Main condition/diagnosis/issues treated during session today, 5/10/2017 : sobriety, treatment non compliance, custody issues with her children, coping skills, anxiety management    I employed Cognitive Behavioral therapy techniques, Reality-Oriented psychotherapy, as well as supportive psychotherapy in regards to various ongoing psychosocial stressors, including the following: pre-admission and current problems; housing issues; occupational issues; legal issues; medical issues; and stress of hospitalization. Interpersonal relationship issues and psychodynamic conflicts explored. Attempts made to alleviate maladaptive patterns. We, also, worked on issues of denial & effects of substance dependency/use     Overall, patient is not progressing    Treatment Plan Update (reviewed an updated 5/10/2017) : I will modify psychotherapy tx plan by implementing more stress management strategies, building upon cognitive behavioral techniques, increasing coping skills, as well as shoring up psychological defenses). An extended energy and skill set was needed to engage pt in psychotherapy due to some of the following: resistiveness, complexity, negativity, confrontational nature, hostile behaviors, and/or severe abnormalities in thought processes/psychosis resulting in the loss of expressive/receptive language communication skills. E & M PROGRESS NOTE:         HISTORY       CC:  \"depression in the context of withdrawal from heroin and other substances\"  HISTORY OF PRESENT ILLNESS/INTERVAL HISTORY:  (reviewed/updated 5/10/2017).   per initial evaluation:     [facility] presents/reports/evidences the following emotional symptoms today, 5/10/2017:depression. The above symptoms have been present for 3 days . These symptoms are of severe severity. The symptoms are constant  in nature. Additional symptomatology and features include agitation and anxiety. SIDE EFFECTS: (reviewed/updated 5/10/2017)  None reported or admitted to. No noted toxicity with use of Depakote/Tegretol/lithium/Clozaril/TCAs   ALLERGIES:(reviewed/updated 5/10/2017)  Allergies   Allergen Reactions    Celexa [Citalopram] Unknown (comments)      MEDICATIONS PRIOR TO ADMISSION:(reviewed/updated 5/10/2017)  No prescriptions prior to admission. PAST MEDICAL HISTORY: Past medical history from the initial psychiatric evaluation has been reviewed (reviewed/updated 5/10/2017) with no additional updates (I asked patient and no additional past medical history provided). No past medical history on file. No past surgical history on file. SOCIAL HISTORY: Social history from the initial psychiatric evaluation has been reviewed (reviewed/updated 5/10/2017) with no additional updates (I asked patient and no additional social history provided). Social History     Social History    Marital status: UNKNOWN     Spouse name: N/A    Number of children: N/A    Years of education: N/A     Occupational History    Not on file. Social History Main Topics    Smoking status: Not on file    Smokeless tobacco: Not on file    Alcohol use Not on file    Drug use: Not on file    Sexual activity: Not on file     Other Topics Concern    Not on file     Social History Narrative    29year old  female admitted voluntarily for severe depression in the context of polysubstance dependence and loss of custody of her 3 children.  Patient and her childrens' father have been fighting the legal system for 4 years now, in order to regain custody of their children, which [facility] has removed from their home. The pat. And her boyfriend, have been unable to sustain gainful employment not to abstain from illicit drugs. The Pt's UDS was pan positive for methamphetamines, THC, Opiates, barbiturates, cocaine, and benzodiazepines. Pt reported to the ED after an argument with her jostin' father, after which she attempted suicide bt cutting he left wrist, requiring sutures. She denies any past hx of psychiatric treatment. FAMILY HISTORY: Family history from the initial psychiatric evaluation has been reviewed (reviewed/updated 5/10/2017) with no additional updates (I asked patient and no additional family history provided). No family history on file. REVIEW OF SYSTEMS: (reviewed/updated 5/10/2017)  Appetite:no change from normal   Sleep: no change   All other Review of Systems: Psychological ROS: positive for - behavioral disorder  Respiratory ROS: positive for - no problems breathing and no SOB  Cardiovascular ROS: no chest pain or dyspnea on exertion         MENTAL STATUS EXAM & VITALS     MENTAL STATUS EXAM (MSE):    MSE FINDINGS ARE WITHIN NORMAL LIMITS (WNL) UNLESS OTHERWISE STATED BELOW. ( ALL OF THE BELOW CATEGORIES OF THE MSE HAVE BEEN REVIEWED (reviewed 5/10/2017) AND UPDATED AS DEEMED APPROPRIATE )  General Presentation age appropriate, evasive and guarded   Orientation oriented to time, place and person   Vital Signs  See below (reviewed 5/10/2017); Vital Signs (BP, Pulse, & Temp) are within normal limits if not listed below.    Gait and Station Stable/steady, no ataxia   Musculoskeletal System No extrapyramidal symptoms (EPS); no abnormal muscular movements or Tardive Dyskinesia (TD); muscle strength and tone are within normal limits   Language No aphasia or dysarthria   Speech:  normal pitch   Thought Processes concretel; normal rate of thoughts; poor abstract reasoning/computation   Thought Associations circumstantial   Thought Content free of hallucinations   Suicidal Ideations none Homicidal Ideations none   Mood:  irritable   Affect:  mood-congruent   Memory recent  fair   Memory remote:  fair   Concentration/Attention:  fair   Fund of Knowledge below average   Insight:  poor   Reliability poor   Judgment:  limited          VITALS:     Patient Vitals for the past 24 hrs:   Temp Pulse Resp BP SpO2   05/10/17 0007 - 74 - - -   05/09/17 1939 98.2 °F (36.8 °C) 66 16 119/81 99 %   05/09/17 1703 98.5 °F (36.9 °C) 62 18 117/77 98 %   05/09/17 1430 97.5 °F (36.4 °C) 82 16 128/82 98 %   05/09/17 1200 97.4 °F (36.3 °C) 84 16 125/85 98 %     Wt Readings from Last 3 Encounters:   No data found for Wt     Temp Readings from Last 3 Encounters:   05/09/17 98.2 °F (36.8 °C)     BP Readings from Last 3 Encounters:   05/09/17 119/81     Pulse Readings from Last 3 Encounters:   05/10/17 74            DATA     LABORATORY DATA:(reviewed/updated 5/10/2017)  Recent Results (from the past 24 hour(s))   CBC W/O DIFF    Collection Time: 05/10/17  6:54 AM   Result Value Ref Range    WBC 9.3 3.6 - 11.0 K/uL    RBC 4.15 3.80 - 5.20 M/uL    HGB 12.8 11.5 - 16.0 g/dL    HCT 37.7 35.0 - 47.0 %    MCV 90.8 80.0 - 99.0 FL    MCH 30.8 26.0 - 34.0 PG    MCHC 34.0 30.0 - 36.5 g/dL    RDW 12.8 11.5 - 14.5 %    PLATELET 414 379 - 977 K/uL   METABOLIC PANEL, COMPREHENSIVE    Collection Time: 05/10/17  6:54 AM   Result Value Ref Range    Sodium 136 136 - 145 mmol/L    Potassium 4.2 3.5 - 5.1 mmol/L    Chloride 101 97 - 108 mmol/L    CO2 30 21 - 32 mmol/L    Anion gap 5 5 - 15 mmol/L    Glucose 82 65 - 100 mg/dL    BUN 14 6 - 20 MG/DL    Creatinine 0.69 0.55 - 1.02 MG/DL    BUN/Creatinine ratio 20 12 - 20      GFR est AA >60 >60 ml/min/1.73m2    GFR est non-AA >60 >60 ml/min/1.73m2    Calcium 9.1 8.5 - 10.1 MG/DL    Bilirubin, total 0.8 0.2 - 1.0 MG/DL    ALT (SGPT) 16 12 - 78 U/L    AST (SGOT) 8 (L) 15 - 37 U/L    Alk.  phosphatase 74 45 - 117 U/L    Protein, total 7.1 6.4 - 8.2 g/dL    Albumin 3.5 3.5 - 5.0 g/dL    Globulin 3.6 2.0 - 4.0 g/dL    A-G Ratio 1.0 (L) 1.1 - 2.2     PROTHROMBIN TIME + INR    Collection Time: 05/10/17  6:54 AM   Result Value Ref Range    INR 1.0 0.9 - 1.1      Prothrombin time 9.9 9.0 - 11.1 sec   GLUCOSE, FASTING    Collection Time: 05/10/17  6:54 AM   Result Value Ref Range    Glucose 82 65 - 100 MG/DL   LIPID PANEL    Collection Time: 05/10/17  6:54 AM   Result Value Ref Range    LIPID PROFILE          Cholesterol, total 131 <200 MG/DL    Triglyceride 101 <150 MG/DL    HDL Cholesterol 63 MG/DL    LDL, calculated 47.8 0 - 100 MG/DL    VLDL, calculated 20.2 MG/DL    CHOL/HDL Ratio 2.1 0 - 5.0       No results found for: VALF2, VALAC, VALP, VALPR, DS6, CRBAM, CRBAMP, CARB2, XCRBAM  No results found for: LI, LIH, LITHM   RADIOLOGY REPORTS:(reviewed/updated 5/10/2017)  Xr Chest Port    Result Date: 5/9/2017  EXAM:  XR CHEST PORT INDICATION:  suicidal ideation COMPARISON:  None. FINDINGS: A portable AP radiograph of the chest was obtained at 1610 hours. The patient is on a cardiac monitor. The lungs are clear. The cardiac and mediastinal contours and pulmonary vascularity are normal.  The bones and soft tissues are grossly within normal limits.      IMPRESSION: Normal chest.          MEDICATIONS     ALL MEDICATIONS:   Current Facility-Administered Medications   Medication Dose Route Frequency    ziprasidone (GEODON) 20 mg in sterile water (preservative free) 1 mL injection  20 mg IntraMUSCular BID PRN    OLANZapine (ZyPREXA) tablet 5 mg  5 mg Oral Q6H PRN    benztropine (COGENTIN) tablet 2 mg  2 mg Oral BID PRN    benztropine (COGENTIN) injection 2 mg  2 mg IntraMUSCular BID PRN    LORazepam (ATIVAN) injection 2 mg  2 mg IntraMUSCular Q4H PRN    zolpidem (AMBIEN) tablet 10 mg  10 mg Oral QHS PRN    acetaminophen (TYLENOL) tablet 650 mg  650 mg Oral Q4H PRN    ibuprofen (MOTRIN) tablet 400 mg  400 mg Oral Q8H PRN    magnesium hydroxide (MILK OF MAGNESIA) 400 mg/5 mL oral suspension 30 mL  30 mL Oral DAILY PRN    nicotine (NICODERM CQ) 21 mg/24 hr patch 1 Patch  1 Patch TransDERmal DAILY PRN    folic acid (FOLVITE) tablet 1 mg  1 mg Oral DAILY    thiamine (B-1) tablet 100 mg  100 mg Oral DAILY    LORazepam (ATIVAN) injection 2 mg  2 mg IntraMUSCular Q1H PRN    LORazepam (ATIVAN) injection 4 mg  4 mg IntraMUSCular Q1H PRN    methadone (DOLOPHINE) tablet 15 mg  15 mg Oral DAILY    promethazine (PHENERGAN) tablet 25 mg  25 mg Oral Q6H PRN    loperamide (IMODIUM) capsule 2 mg  2 mg Oral PRN    dicyclomine (BENTYL) 10 mg/mL injection 20 mg  20 mg IntraMUSCular Q6H PRN    hydrOXYzine HCl (ATARAX) tablet 50 mg  50 mg Oral Q4H PRN    methocarbamol (ROBAXIN) tablet 500 mg  500 mg Oral BID PRN      SCHEDULED MEDICATIONS:   Current Facility-Administered Medications   Medication Dose Route Frequency    folic acid (FOLVITE) tablet 1 mg  1 mg Oral DAILY    thiamine (B-1) tablet 100 mg  100 mg Oral DAILY    methadone (DOLOPHINE) tablet 15 mg  15 mg Oral DAILY          ASSESSMENT & PLAN     DIAGNOSES REQUIRING ACTIVE TREATMENT AND MONITORING: (reviewed/updated 5/10/2017)  Patient Active Hospital Problem List:   Bipolar disorder ([facility] (5/9/2017)    Assessment: historical diagnosis- presently asymptomatic    Plan: continue observation, treatsymptoms   Polysubstance dependence ([facility] (5/9/2017)    Assessment: daily use with increased tolerance of: cocaine, benzos, THC, opiates, methamphetamine, amphetamines    Plan: detox/ Methadone taper and CIWA w/ ativan            I will continue to monitor blood levels (Depakote, Tegretol, lithium, clozapine---a drug with a narrow therapeutic index= NTI) and associated labs for drug therapy implemented that require intense monitoring for toxicity as deemed appropriate based on current medication side effects and pharmacodynamically determined drug 1/2 lives.     In summary, Shobha Cardona, is a 29 y.o.  female who presents with a severe exacerbation of the principal diagnosis of Bipolar disorder ([facility]  Patient's condition is improving. Patient requires continued inpatient hospitalization for further stabilization, safety monitoring and medication management. I will continue to coordinate the provision of individual, milieu, occupational, group, and substance abuse therapies to address target symptoms/diagnoses as deemed appropriate for the individual patient. A coordinated, multidisplinary treatment team round was conducted with the patient (this team consists of the nurse, psychiatric unit pharmcist,  and writer). Complete current electronic health record for patient has been reviewed today including consultant notes, ancillary staff notes, nurses and psychiatric tech notes. Suicide risk assessment completed and patient deemed to be of low risk for suicide at this time. The following regarding medications was addressed during rounds with patient:   the risks and benefits of the proposed medication. The patient was given the opportunity to ask questions. Informed consent given to the use of the above medications. Will continue to adjust psychiatric and non-psychiatric medications (see above \"medication\" section and orders section for details) as deemed appropriate & based upon diagnoses and response to treatment. I will continue to order blood tests/labs and diagnostic tests as deemed appropriate and review results as they become available (see orders for details and above listed lab/test results). I will order psychiatric records from previous [facility] hospitals to further elucidate the nature of patient's psychopathology and review once available. I will gather additional collateral information from friends, family and o/p treatment team to further elucidate the nature of patient's psychopathology and baselline level of psychiatric functioning.          I certify that this patient's inpatient psychiatric hospital services furnished since the previous certification were, and continue to be, required for treatment that could reasonably be expected to improve the patient's condition, or for diagnostic study, and that the patient continues to need, on a daily basis, active treatment furnished directly by or requiring the supervision of inpatient psychiatric facility personnel. In addition, the hospital records show that services furnished were intensive treatment services, admission or related services, or equivalent services.     EXPECTED DISCHARGE DATE/DAY: TBD     DISPOSITION: Home       Signed By:   Jose A Meraz MD  5/10/2017

## 2017-05-11 PROCEDURE — 74011250637 HC RX REV CODE- 250/637: Performed by: PSYCHIATRY & NEUROLOGY

## 2017-05-11 PROCEDURE — 65220000003 HC RM SEMIPRIVATE PSYCH

## 2017-05-11 RX ADMIN — ZOLPIDEM TARTRATE 10 MG: 10 TABLET ORAL at 21:18

## 2017-05-11 RX ADMIN — METHADONE HYDROCHLORIDE 15 MG: 10 TABLET ORAL at 09:11

## 2017-05-11 RX ADMIN — FOLIC ACID 1 MG: 1 TABLET ORAL at 09:11

## 2017-05-11 RX ADMIN — Medication 100 MG: at 09:12

## 2017-05-11 NOTE — PROGRESS NOTES
Problem: Manic Behavior (Adult/Pediatric)  Goal: *STG: Participates in treatment plan  Outcome: Progressing Towards Goal  Pt. Met with Tiffany Crenshaw today  Pt. Denies suicidal ideation  Anxiety but no jorge  Exhibited    Thought process clear  Discussing discharge  Planned  When pt. Can get a ride to [facility]   Appropriate boundaries   Medication compliant     Goal: Interventions  Outcome: Progressing Towards Goal  Will continue to monitor  On 15 min. Checks for safety  Assess mood   Medication effectiveness   Encourage groups    Problem: Chemical Dependency (Adult/Pediatric)  Goal: *STG: Participates in treatment plan  Outcome: Progressing Towards Goal  Pt.  Completed methadone taper    COW 0  Vital signs  Within defined limits

## 2017-05-11 NOTE — BH NOTES
Nocotine patch applied, per pt request.  Old patch removed.     2119  Pt requests and receives Ambien 10 mg to promote rest.

## 2017-05-11 NOTE — BH NOTES
GROUP THERAPY PROGRESS NOTE    Kathy Sinclair is participating in [place]. Group time: 30 minutes    Personal goal for participation: daily progress      Goal orientation: community     Group therapy participation: passive    Therapeutic interventions reviewed and discussed:     Impression of participation: The patient was present during the group session but her mind appeared to be far away.

## 2017-05-11 NOTE — BH NOTES
PSYCHIATRIC PROGRESS NOTE         Patient Name  Breanne Bustamante   Date of Birth 1980   [facility] [id number]   Medical Record Number  [id number]      Age  29 y.o. PCP PROVIDER UNKNOWN   Admit date:  5/9/2017    Room Number  730/01  @ [facility]   Date of Service  5/11/2017          PSYCHOTHERAPY SESSION NOTE:  Length of psychotherapy session: 45 minutes    Main condition/diagnosis/issues treated during session today, 5/11/2017 : sobriety, treatment non compliance, custody issues with her children, coping skills, anxiety management    I employed Cognitive Behavioral therapy techniques, Reality-Oriented psychotherapy, as well as supportive psychotherapy in regards to various ongoing psychosocial stressors, including the following: pre-admission and current problems; housing issues; occupational issues; legal issues; medical issues; and stress of hospitalization. Interpersonal relationship issues and psychodynamic conflicts explored. Attempts made to alleviate maladaptive patterns. We, also, worked on issues of denial & effects of substance dependency/use     Overall, patient is not progressing    Treatment Plan Update (reviewed an updated 5/11/2017) : I will modify psychotherapy tx plan by implementing more stress management strategies, building upon cognitive behavioral techniques, increasing coping skills, as well as shoring up psychological defenses). An extended energy and skill set was needed to engage pt in psychotherapy due to some of the following: resistiveness, complexity, negativity, confrontational nature, hostile behaviors, and/or severe abnormalities in thought processes/psychosis resulting in the loss of expressive/receptive language communication skills. E & M PROGRESS NOTE:         HISTORY       CC:  \"depression in the context of withdrawal from heroin and other substances\"  HISTORY OF PRESENT ILLNESS/INTERVAL HISTORY:  (reviewed/updated 5/11/2017).   per initial evaluation:     Ashlie Salinas presents/reports/evidences the following emotional symptoms today, 5/11/2017:depression. The above symptoms have been present for 3 days . These symptoms are of severe severity. The symptoms are constant  in nature. Additional symptomatology and features include agitation and anxiety. 5/11/17- Pt has had no sxs of withdrawal from benzos or opiates. She has spoor insight re her need for rehab and continues to misunderstand the avilable options housing. SIDE EFFECTS: (reviewed/updated 5/11/2017)  None reported or admitted to. No noted toxicity with use of Depakote/Tegretol/lithium/Clozaril/TCAs   ALLERGIES:(reviewed/updated 5/11/2017)  Allergies   Allergen Reactions    Celexa [Citalopram] Unknown (comments)      MEDICATIONS PRIOR TO ADMISSION:(reviewed/updated 5/11/2017)  No prescriptions prior to admission. PAST MEDICAL HISTORY: Past medical history from the initial psychiatric evaluation has been reviewed (reviewed/updated 5/11/2017) with no additional updates (I asked patient and no additional past medical history provided). No past medical history on file. No past surgical history on file. SOCIAL HISTORY: Social history from the initial psychiatric evaluation has been reviewed (reviewed/updated 5/11/2017) with no additional updates (I asked patient and no additional social history provided). Social History     Social History    Marital status: UNKNOWN     Spouse name: N/A    Number of children: N/A    Years of education: N/A     Occupational History    Not on file.      Social History Main Topics    Smoking status: Not on file    Smokeless tobacco: Not on file    Alcohol use Not on file    Drug use: Not on file    Sexual activity: Not on file     Other Topics Concern    Not on file     Social History Narrative    29year old  female admitted voluntarily for severe depression in the context of polysubstance dependence and loss of custody of her 3 children. Patient and her childrens' father have been fighting the legal system for 4 years now, in order to regain custody of their children, which CPS has removed from their home. The pat. And her boyfriend, have been unable to sustain gainful employment not to abstain from illicit drugs. The Pt's UDS was pan positive for methamphetamines, THC, Opiates, barbiturates, cocaine, and benzodiazepines. Pt reported to the ED after an argument with her chidrens' father, after which she attempted suicide bt cutting he left wrist, requiring sutures. She denies any past hx of psychiatric treatment. FAMILY HISTORY: Family history from the initial psychiatric evaluation has been reviewed (reviewed/updated 5/11/2017) with no additional updates (I asked patient and no additional family history provided). No family history on file. REVIEW OF SYSTEMS: (reviewed/updated 5/11/2017)  Appetite:no change from normal   Sleep: no change   All other Review of Systems: Psychological ROS: positive for - behavioral disorder  Respiratory ROS: positive for - no problems breathing and no SOB  Cardiovascular ROS: no chest pain or dyspnea on exertion         MENTAL STATUS EXAM & VITALS     MENTAL STATUS EXAM (MSE):    MSE FINDINGS ARE WITHIN NORMAL LIMITS (WNL) UNLESS OTHERWISE STATED BELOW. ( ALL OF THE BELOW CATEGORIES OF THE MSE HAVE BEEN REVIEWED (reviewed 5/11/2017) AND UPDATED AS DEEMED APPROPRIATE )  General Presentation age appropriate, evasive and guarded   Orientation oriented to time, place and person   Vital Signs  See below (reviewed 5/11/2017); Vital Signs (BP, Pulse, & Temp) are within normal limits if not listed below.    Gait and Station Stable/steady, no ataxia   Musculoskeletal System No extrapyramidal symptoms (EPS); no abnormal muscular movements or Tardive Dyskinesia (TD); muscle strength and tone are within normal limits   Language No aphasia or dysarthria   Speech:  normal pitch   Thought Processes concretel; normal rate of thoughts; poor abstract reasoning/computation   Thought Associations circumstantial   Thought Content free of hallucinations   Suicidal Ideations none   Homicidal Ideations none   Mood:  irritable   Affect:  mood-congruent   Memory recent  fair   Memory remote:  fair   Concentration/Attention:  fair   Fund of Knowledge below average   Insight:  poor   Reliability poor   Judgment:  limited          VITALS:     Patient Vitals for the past 24 hrs:   Temp Pulse Resp BP SpO2   05/11/17 1200 98.6 °F (37 °C) 61 16 109/76 100 %   05/11/17 0800 98.6 °F (37 °C) 86 16 104/78 100 %   05/10/17 2014 98.2 °F (36.8 °C) 65 18 104/64 -   05/10/17 1604 98.3 °F (36.8 °C) (!) 59 14 106/80 100 %     Wt Readings from Last 3 Encounters:   No data found for Wt     Temp Readings from Last 3 Encounters:   05/11/17 98.6 °F (37 °C)     BP Readings from Last 3 Encounters:   05/11/17 109/76     Pulse Readings from Last 3 Encounters:   05/11/17 61            DATA     LABORATORY DATA:(reviewed/updated 5/11/2017)  No results found for this or any previous visit (from the past 24 hour(s)). No results found for: VALF2, VALAC, VALP, VALPR, DS6, CRBAM, CRBAMP, CARB2, XCRBAM  No results found for: LI, LIH, LITHM   RADIOLOGY REPORTS:(reviewed/updated 5/11/2017)  Xr Chest Port    Result Date: 5/9/2017  EXAM:  XR CHEST PORT INDICATION:  suicidal ideation COMPARISON:  None. FINDINGS: A portable AP radiograph of the chest was obtained at 1610 hours. The patient is on a cardiac monitor. The lungs are clear. The cardiac and mediastinal contours and pulmonary vascularity are normal.  The bones and soft tissues are grossly within normal limits.      IMPRESSION: Normal chest.          MEDICATIONS     ALL MEDICATIONS:   Current Facility-Administered Medications   Medication Dose Route Frequency    ziprasidone (GEODON) 20 mg in sterile water (preservative free) 1 mL injection  20 mg IntraMUSCular BID PRN    OLANZapine (ZyPREXA) tablet 5 mg  5 mg Oral Q6H PRN    benztropine (COGENTIN) tablet 2 mg  2 mg Oral BID PRN    benztropine (COGENTIN) injection 2 mg  2 mg IntraMUSCular BID PRN    LORazepam (ATIVAN) injection 2 mg  2 mg IntraMUSCular Q4H PRN    zolpidem (AMBIEN) tablet 10 mg  10 mg Oral QHS PRN    acetaminophen (TYLENOL) tablet 650 mg  650 mg Oral Q4H PRN    ibuprofen (MOTRIN) tablet 400 mg  400 mg Oral Q8H PRN    magnesium hydroxide (MILK OF MAGNESIA) 400 mg/5 mL oral suspension 30 mL  30 mL Oral DAILY PRN    nicotine (NICODERM CQ) 21 mg/24 hr patch 1 Patch  1 Patch TransDERmal DAILY PRN    folic acid (FOLVITE) tablet 1 mg  1 mg Oral DAILY    thiamine (B-1) tablet 100 mg  100 mg Oral DAILY    loperamide (IMODIUM) capsule 2 mg  2 mg Oral PRN    hydrOXYzine HCl (ATARAX) tablet 50 mg  50 mg Oral Q4H PRN      SCHEDULED MEDICATIONS:   Current Facility-Administered Medications   Medication Dose Route Frequency    folic acid (FOLVITE) tablet 1 mg  1 mg Oral DAILY    thiamine (B-1) tablet 100 mg  100 mg Oral DAILY          ASSESSMENT & PLAN     DIAGNOSES REQUIRING ACTIVE TREATMENT AND MONITORING: (reviewed/updated 5/11/2017)  Patient Active Hospital Problem List:   Bipolar disorder ([facility] (5/9/2017)    Assessment: historical diagnosis- presently asymptomatic    Plan: continue observation, treatsymptoms   Polysubstance dependence ([facility] (5/9/2017)    Assessment: daily use with increased tolerance of: cocaine, benzos, THC, opiates, methamphetamine, amphetamines    Plan: detox/ Methadone taper and CIWA w/ ativan            I will continue to monitor blood levels (Depakote, Tegretol, lithium, clozapine---a drug with a narrow therapeutic index= NTI) and associated labs for drug therapy implemented that require intense monitoring for toxicity as deemed appropriate based on current medication side effects and pharmacodynamically determined drug 1/2 lives.     In summary, Neptali Thacker, is a 29 y.o.  female who presents with a severe exacerbation of the principal diagnosis of Bipolar disorder ([facility]  Patient's condition is improving. Patient requires continued inpatient hospitalization for further stabilization, safety monitoring and medication management. I will continue to coordinate the provision of individual, milieu, occupational, group, and substance abuse therapies to address target symptoms/diagnoses as deemed appropriate for the individual patient. A coordinated, multidisplinary treatment team round was conducted with the patient (this team consists of the nurse, psychiatric unit pharmcist,  and writer). Complete current electronic health record for patient has been reviewed today including consultant notes, ancillary staff notes, nurses and psychiatric tech notes. Suicide risk assessment completed and patient deemed to be of low risk for suicide at this time. The following regarding medications was addressed during rounds with patient:   the risks and benefits of the proposed medication. The patient was given the opportunity to ask questions. Informed consent given to the use of the above medications. Will continue to adjust psychiatric and non-psychiatric medications (see above \"medication\" section and orders section for details) as deemed appropriate & based upon diagnoses and response to treatment. I will continue to order blood tests/labs and diagnostic tests as deemed appropriate and review results as they become available (see orders for details and above listed lab/test results). I will order psychiatric records from previous [facility] hospitals to further elucidate the nature of patient's psychopathology and review once available. I will gather additional collateral information from friends, family and o/p treatment team to further elucidate the nature of patient's psychopathology and baselline level of psychiatric functioning.          I certify that this patient's inpatient psychiatric hospital services furnished since the previous certification were, and continue to be, required for treatment that could reasonably be expected to improve the patient's condition, or for diagnostic study, and that the patient continues to need, on a daily basis, active treatment furnished directly by or requiring the supervision of inpatient psychiatric facility personnel. In addition, the hospital records show that services furnished were intensive treatment services, admission or related services, or equivalent services.     EXPECTED DISCHARGE DATE/DAY: TBD     DISPOSITION: Home       Signed By:   Tammy Cervantes MD  5/11/2017

## 2017-05-11 NOTE — BH NOTES
GROUP THERAPY PROGRESS NOTE    Jose Camacho participated in an Afternoon Process Group on the Acute Unit with a focus on identifying feelings, planning for the day, and learning more about DBT concepts of \"Mindfulness. \"     Group time: 45 minutes. Personal goal for participation: To identify feelings and increase the capacity to manage ones ability to cope. Goal orientation: The patient will be able to introduce themselves to their peers, identify their feelings, and consider the importance of coping skill development. The group session included a didactic section and the opportunity for patients to respond. Group therapy participation: This patient actively participated in the therapy session. Therapeutic interventions reviewed and discussed: The patients were encouraged to identify themselves by their first names, acknowledge their feelings, and define a goal for their day. This was followed by a didactic session on DBT mindsets. These concepts included:   1) One-Mindfully: In the moment on the front and the following suggestions on the back of the card: a) Just do one thing at a time; b) Let go of distractions; c) Come back to the moment and what you are doing; and d) Do each thing with all your attention. 2) Effectiveness: Focus on what works: a) Do what needs to be done; b) Play by the rules (I am not an exception) and consider the context; c) Act skillfully within the given situation; d) Keep an eye on your objectives (and do what is necessary); e) Let go of vengeance, useless anger, and the need to be righteous. 3) Observe: Just notice: a) Have a Gabe mind; b) Control your attention, cling to nothing; c) Be alert; d) Step inside and observe; e) Watch thoughts come and go; f) Notice what flows through your senses.    4) Nonjudgmental Stance: a) Be aware but dont evaluate; b) Sort opinions from facts; c) Accept each moment; d) Acknowledge the helpful and the harmful, but dont  it; e) Dont  your judging. 5) Wise Mind: a) Integration of emotion mind and reasonable mind; b) Allows intuition; c) Find it in the belly, the center of your head, or by following your breath. At the end of the session all the group members were provided a summary of the topics discussed and a worksheet to review on their own time. Impression of participation: The patient said sometimes \"frustrated and confused\" with the multiple conversations taking place on the unit at the same time. She admitted that she sometimes needs to seek a quiet place in her room or to look at magazines in order to distract herself. She was alert, cooperative, and appeared fully oriented. Her affect was anxious. Her thought was coherent and relevant to the content of her speech. This patient listened to the didactic session and nodded at various sections she agreed with. She said she used meditation as a way to calm herself. She expressed no SI/HI nor overt psychotic symptoms in group. She said she planned to continue going to groups and work on her discharge plans.

## 2017-05-11 NOTE — BH NOTES
GROUP THERAPY PROGRESS NOTE    Trish November is participating in Reflections.      Group time: 15 minutes    Personal goal for participation: Pt stated she had a good day and has been getting good rest even though her methadone sometimes makes her feel groggy    Goal orientation: personal    Group therapy participation: active    Therapeutic interventions reviewed and discussed: Review unit rules and reflect on each Pt's day    Impression of participation: Pt was engaged in group

## 2017-05-11 NOTE — INTERDISCIPLINARY ROUNDS
Behavioral Health Interdisciplinary Rounds     Patient Name: Kathy Casarez  Age: 29 y.o. Room/Bed:  730/01  Primary Diagnosis: Bipolar disorder ([facility]   Admission Status: Voluntary     Readmission within 30 days: no  Power of  in place: no  Patient requires a blocked bed: no          Reason for blocked bed: n/a    VTE Prophylaxis: Not indicated  Mobility needs/Fall risk: no    Nutritional Plan: no  Consults: n/a         Labs/Testing due today?: no    Sleep hours: 8.25       Participation in Care/Groups:  yes  Medication Compliant?: Yes  PRNS (last 24 hours): Anti smoking    Restraints (last 24 hours):  no  Substance Abuse:  no     CIWA (range last 24 hours): 0 COWS (range last 24 hours): 0  Alcohol screening (AUDIT) completed -  AUDIT Score: 0  If applicable, date SBIRT discussed in treatment team AND documented: n/a  Tobacco - patient is a smoker: no   Date tobacco education completed by RN: n/a  24 hour chart check complete: yes     Patient goal(s) for today: Develop travel plans to return home. Treatment team focus/goals: Discuss travel options ad tx follow up out pt services  LOS:  2  Expected LOS:   [address] -     Name of Decision maker if patient has Psychiatric Care Directive   Patient was offered information   Financial concerns/prescription coverage:    Date of last family contact: Mother contacted [facility] to discuss progress and d/c concerns. Mother is agreeing that pt can come to live with her and she will support any efforts for her to get better. Mother wants daughter to enter substance tx but she told her that is not what she wants to do. SW agreed that she cannot be forced into tx unless court ordered. SW advised that she can receive MH/SA tx @ [address] [place] [address] in [facility]. Mother was encouraged that is an option and she agreed to offered assistance with transportation.  SW asked if was any help on the part of the family to assist with providing transportation back home? Mother stated she would contact her children and relatives to obtain enough money for a bus ticket. SW asked her to call tomorrow with her efforts. Family requesting physician contact today:   Discharge plan: Pt has agreed to return home to live with her mother     Outpatient provider(s): [address] [place], [place] [address] in [facility]    Participating treatment team members: Meena Mendieta, Dr. Justa Campa and COLTNO Pop RN

## 2017-05-11 NOTE — PROGRESS NOTES
Problem: Manic Behavior (Adult/Pediatric)  Goal: *STG: Remains safe in hospital  Outcome: Progressing Towards Goal  Lying quietly in bed with eyes closed, respirations even and unlabored ,NAD noted   Bathroom light on for safety ,

## 2017-05-11 NOTE — PROGRESS NOTES
I reviewed the medical records including Vitals, lab, imaging, progress notes and current orders. I do not have anything to add today. We will recommend to continue the current care. We will sign off now. Please call us for any questions or concerns.

## 2017-05-12 VITALS
RESPIRATION RATE: 16 BRPM | OXYGEN SATURATION: 100 % | HEART RATE: 72 BPM | TEMPERATURE: 98.7 F | DIASTOLIC BLOOD PRESSURE: 74 MMHG | SYSTOLIC BLOOD PRESSURE: 119 MMHG

## 2017-05-12 PROCEDURE — 74011250637 HC RX REV CODE- 250/637: Performed by: PSYCHIATRY & NEUROLOGY

## 2017-05-12 RX ORDER — HYDROXYZINE 50 MG/1
50 TABLET, FILM COATED ORAL
Qty: 90 TAB | Refills: 9 | Status: SHIPPED | OUTPATIENT
Start: 2017-05-12 | End: 2017-05-22

## 2017-05-12 RX ADMIN — FOLIC ACID 1 MG: 1 TABLET ORAL at 08:27

## 2017-05-12 RX ADMIN — Medication 100 MG: at 08:27

## 2017-05-12 NOTE — INTERDISCIPLINARY ROUNDS
Behavioral Health Interdisciplinary Rounds     Patient Name: Brady Gallardo  Age: 29 y.o. Room/Bed:  730/01  Primary Diagnosis: Bipolar disorder ([facility]   Admission Status: Voluntary     Readmission within 30 days: no  Power of  in place: no  Patient requires a blocked bed: no          Reason for blocked bed:     VTE Prophylaxis: Not indicated  Mobility needs/Fall risk: no    Nutritional Plan: no  Consults:        Labs/Testing due today?: no    Sleep hours:  7.75     Participation in Care/Groups:  yes  Medication Compliant?: Yes  PRNS (last 24 hours): Sleep Aid    Restraints (last 24 hours):  no  Substance Abuse:  no  CIWA (range last 24 hours):  COWS (range last 24 hours):   Alcohol screening (AUDIT) completed -  AUDIT Score: 0  If applicable, date SBIRT discussed in treatment team AND documented:   Tobacco - patient is a smoker: no   Date tobacco education completed by RN: n/a  24 hour chart check complete: yes    Patient goal(s) for today: Prepare to return home  Treatment team focus/goals: D/C to Home   LOS:  3  Expected LOS:   [address] -  no  Name of Decision maker if patient has Psychiatric Care Directive  Patient was offered information   Financial concerns/prescription coverage:    Date of last family contact: Pt has been in contact with mother via phone    Family requesting physician contact today:  no  Discharge plan: Return to live with mother     Outpatient provider(s): [address]     Participating treatment team members: COLTON Landis RN, James LUJAN and Dr Zenon Neal

## 2017-05-12 NOTE — BH NOTES
GROUP THERAPY PROGRESS NOTE    Yuri Scruggs is participating in Reflections. Group time: 10 minutes    Personal goal for participation: unit orientation and daily progress    Goal orientation: personal    Group therapy participation: passive    Therapeutic interventions reviewed and discussed: \"Stress\" handout/ Nutritional word search    Impression of participation: Quiet in group. Anxious/depressed affect.

## 2017-05-12 NOTE — PROGRESS NOTES
Problem: Manic Behavior (Adult/Pediatric)  Goal: *STG: Remains safe in hospital  Outcome: Progressing Towards Goal  Lying quietly in bed with eyes closed , Respirations even and unlabored , NAD noted   Night light on for safety , Maintaining Q15 min hours for safety

## 2017-05-12 NOTE — BH NOTES
DC instructions explained to pt. DC folder provided to pt with a copy of her DC instructions, prescription, and bus ticket confirmation. Pt verbalized understanding. Pt did not come in with shoes. Shoes provided from the [employer] closet donation.

## 2017-05-12 NOTE — PROGRESS NOTES
Problem: Manic Behavior (Adult/Pediatric)  Goal: *STG/LTG: Complies with medication therapy  Outcome: Progressing Towards Goal  Patient medications and meals compliant at this time. Up visible on unit. Appeared irritable, anxious, agitated, but cooperative currently. No acute distress noted. Continue patient on Q 15 min safety checks.

## 2017-05-12 NOTE — DISCHARGE SUMMARY
PSYCHIATRIC DISCHARGE SUMMARY         IDENTIFICATION:    Patient Name  Bri Archer   Date of Birth 1980   [facility] [id number]   Medical Record Number  [id number]      Age  29 y.o. PCP PROVIDER UNKNOWN   Admit date:  5/9/2017    Discharge date: 5/12/2017   Room Number  730/01  @ [facility]   Date of Service  5/12/2017               TYPE OF DISCHARGE: REGULAR               CONDITION AT DISCHARGE: good and improved       PROVISIONAL & DISCHARGE DIAGNOSES:    Problem List  Never Reviewed          Codes Class    * (Principal)Bipolar disorder ([facility] [address] ICD-10-CM: F31.9  ICD-9-CM: 296.80         Polysubstance dependence ([facility] [address] ICD-10-CM: F19.20  ICD-9-CM: 304.80               Active Hospital Problems    *Bipolar disorder ([facility] [address]      Polysubstance dependence ([facility] [address]        DISCHARGE DIAGNOSIS:   Axis I:  SEE ABOVE  Axis II: SEE ABOVE  Axis III: SEE ABOVE  Axis IV:  lack of structure  Axis V:  50 on admission, 60 on discharge 60(baseline)       CC & HISTORY OF PRESENT ILLNESS:  29 yearb old  female admitted on TDO for severe agitation and psychosis, due to using multiple illicit substances. She had a UDS + for THC, OPATES, cocaine, amphetamines, PCP and methamphetamines. Pt was successfully setoxed. Disorganized thinking cleared, and pt did not require psychiatric medications except PRN Atarax for anxxiety. At discharge she denied SI/HI intent and plan. SOCIAL HISTORY:    Social History     Social History    Marital status: UNKNOWN     Spouse name: N/A    Number of children: N/A    Years of education: N/A     Occupational History    Not on file.      Social History Main Topics    Smoking status: Not on file    Smokeless tobacco: Not on file    Alcohol use Not on file    Drug use: Not on file    Sexual activity: Not on file     Other Topics Concern    Not on file     Social History Narrative    29year old  female admitted voluntarily for severe depression in the context of polysubstance dependence and loss of custody of her 3 children. Patient and her childrens' father have been fighting the legal system for 4 years now, in order to regain custody of their children, which CPS has removed from their home. The pat. And her boyfriend, have been unable to sustain gainful employment not to abstain from illicit drugs. The Pt's UDS was pan positive for methamphetamines, THC, Opiates, barbiturates, cocaine, and benzodiazepines. Pt reported to the ED after an argument with her chidrens' father, after which she attempted suicide bt cutting he left wrist, requiring sutures. She denies any past hx of psychiatric treatment. FAMILY HISTORY:   No family history on file. HOSPITALIZATION COURSE:    Kathy Sinclair was admitted to the inpatient psychiatric unit formerly [facility] for acute psychiatric stabilization in regards to symptomatology as described in the HPI above. The differential diagnosis at time of admission included: substance induced psychosis, polysubstance dependence  While on the unit Kathy Sinclair was involved in individual, group, occupational and milieu therapy. Psychiatric medications were adjusted during this hospitalization including methadone. Kathy Sinclair demonstrated a slow, but progressive improvement in overall condition. Much of patient's depression appeared to be related to situational stressors and psychological factors. Please see individual progress notes for more specific details regarding patient's hospitalization course. At time of dc, Kathy Sinclair was without significant problems with depression psychosis  jorge. Overall presentation at time of discharge is most consistent with the diagnosis of adjustment disorder with depressed mood. Patient with request for discharge today. There are no grounds to seek a TDO. Patient has maximized benefit to be derived from acute inpatient psychiatric treatment.   All members of the treatment team concur with each other in regards to plans for discharge today per patient's request.          LABS AND IMAGAING:    Labs Reviewed   METABOLIC PANEL, COMPREHENSIVE - Abnormal; Notable for the following:        Result Value    AST (SGOT) 8 (*)     A-G Ratio 1.0 (*)     All other components within normal limits   CBC W/O DIFF   PROTHROMBIN TIME + INR   GLUCOSE, FASTING   LIPID PANEL   URINALYSIS W/MICROSCOPIC     Admission on 05/09/2017   Component Date Value Ref Range Status    WBC 05/10/2017 9.3  3.6 - 11.0 K/uL Final    RBC 05/10/2017 4.15  3.80 - 5.20 M/uL Final    HGB 05/10/2017 12.8  11.5 - 16.0 g/dL Final    HCT 05/10/2017 37.7  35.0 - 47.0 % Final    MCV 05/10/2017 90.8  80.0 - 99.0 FL Final    MCH 05/10/2017 30.8  26.0 - 34.0 PG Final    MCHC 05/10/2017 34.0  30.0 - 36.5 g/dL Final    RDW 05/10/2017 12.8  11.5 - 14.5 % Final    PLATELET 61/83/1973 932  150 - 400 K/uL Final    Sodium 05/10/2017 136  136 - 145 mmol/L Final    Potassium 05/10/2017 4.2  3.5 - 5.1 mmol/L Final    Chloride 05/10/2017 101  97 - 108 mmol/L Final    CO2 05/10/2017 30  21 - 32 mmol/L Final    Anion gap 05/10/2017 5  5 - 15 mmol/L Final    Glucose 05/10/2017 82  65 - 100 mg/dL Final    BUN 05/10/2017 14  6 - 20 MG/DL Final    Creatinine 05/10/2017 0.69  0.55 - 1.02 MG/DL Final    BUN/Creatinine ratio 05/10/2017 20  12 - 20   Final    GFR est AA 05/10/2017 >60  >60 ml/min/1.73m2 Final    GFR est non-AA 05/10/2017 >60  >60 ml/min/1.73m2 Final    Calcium 05/10/2017 9.1  8.5 - 10.1 MG/DL Final    Bilirubin, total 05/10/2017 0.8  0.2 - 1.0 MG/DL Final    ALT (SGPT) 05/10/2017 16  12 - 78 U/L Final    AST (SGOT) 05/10/2017 8* 15 - 37 U/L Final    Alk.  phosphatase 05/10/2017 74  45 - 117 U/L Final    Protein, total 05/10/2017 7.1  6.4 - 8.2 g/dL Final    Albumin 05/10/2017 3.5  3.5 - 5.0 g/dL Final    Globulin 05/10/2017 3.6  2.0 - 4.0 g/dL Final    A-G Ratio 05/10/2017 1.0* 1.1 - 2.2   Final    INR 05/10/2017 1.0  0.9 - 1.1   Final    Prothrombin time 05/10/2017 9.9  9.0 - 11.1 sec Final    Glucose 05/10/2017 82  65 - 100 MG/DL Final    LIPID PROFILE 05/10/2017        Final    Cholesterol, total 05/10/2017 131  <200 MG/DL Final    Triglyceride 05/10/2017 101  <150 MG/DL Final    HDL Cholesterol 05/10/2017 63  MG/DL Final    LDL, calculated 05/10/2017 47.8  0 - 100 MG/DL Final    VLDL, calculated 05/10/2017 20.2  MG/DL Final    CHOL/HDL Ratio 05/10/2017 2.1  0 - 5.0   Final     Xr Chest Port    Result Date: 5/9/2017  EXAM:  XR CHEST PORT INDICATION:  suicidal ideation COMPARISON:  None. FINDINGS: A portable AP radiograph of the chest was obtained at 1610 hours. The patient is on a cardiac monitor. The lungs are clear. The cardiac and mediastinal contours and pulmonary vascularity are normal.  The bones and soft tissues are grossly within normal limits. IMPRESSION: Normal chest.                   DISPOSITION:    Home. Patient to f/u with o/p drug/etoh rehabilitation, psychiatric, and psychotherapy appointments. Patient is to f/u with internist as directed. FOLLOW-UP CARE:    Activity as tolerated  Regular Diet  Wound Care: none needed. Follow-up Information     Follow up With Details Comments [address]  On 5/12/2017 you need to walk in Monday - Friday 8:30 to 3:00 for intake  [address] [facility]   [phone]   fax [phone]                  PROGNOSIS:  Greatly dependent upon patient's ability to remain sober and to f/u with o/p drug/etoh rehabilitation and psychiatric/psychotherapy appointments as well as to comply with psychiatric medications as prescribed. Patient denies suicidal or homicidal ideations. Leonel Frost fully contracts for safety. Patient reports many positive predictive factors in terms of not attempting suicide or homicide. Patient appears to be at low risk of suicide or homicide.  Patient and family are aware and in agreement with discharge and discharge plan. DISCHARGE MEDICATIONS: (no changes made). Informed consent given for the use of following psychotropic medications:  Current Discharge Medication List      START taking these medications    Details   hydrOXYzine HCl (ATARAX) 50 mg tablet Take 1 Tab by mouth every four (4) hours as needed (Anxiety) for up to 10 days. Indications: anxiety  Qty: 90 Tab, Refills: 9                    A coordinated, multidisplinary treatment team round was conducted with Jordy Porter---this is done daily here at [facility] . This team consists of the nurse, psychiatric unit pharmcist,  and writer. I have spent greater than 35 minutes on discharge work.     Signed:  Nan Wagner MD  5/12/2017

## 2017-05-12 NOTE — BH NOTES
Behavioral Health Transition Record to Provider    Patient Name: Shobha Cardona  YOB: 1980  Medical Record Number: [id number]  Date of Admission: 2017  Date of Discharge: 2017    Attending Provider: Elvin Garcia MD  Discharging Provider: Josh Marcelino. Janette  To contact this individual call [phone] and ask the  to page. If unavailable, ask to be transferred to [facility] Provider on call. [facility] Provider will be available on call  and during holidays     Primary Care Provider: PROVIDER UNKNOWN    Allergies   Allergen Reactions    Celexa [Citalopram] Unknown (comments)          H&P Summary Notes      H&P filed by Lisa Peterson MD at 17 1611 / Draft: Not Electronically Signed     Author:  Lisa Peterson MD Service:  Hospitalist Author Type:  Physician    Filed:  17 1611 Date of Service:  17 1608 Status:  Unsigned Transcription    :  Lisa Peterson MD (Physician)           [address]   HISTORY AND PHYSICAL       Name:  Christine Cam   MR#:  [id number]   :  1980   Account #:  [de-identified]        Date of Adm:  2017       ATTENDING PHYSICIAN: Lisa Peterson MD    CHIEF COMPLAINT: Suicidal ideation. HISTORY OF PRESENT ILLNESS: The patient is a 81-year-old   female with past medical history significant for bipolar disorder, manic   depression and anxiety, who presented to the ED in [place] with   complaints of having racing thoughts and severe difficulty focusing. The patient had suicidal ideation, but denied any significant auditory or   visual hallucinations. She states that at night she notices .  She also   reports that she is undergoing some issues with the custody of her   kids, and just wanted to harm herself and slit her left wrist. The patient   was evaluated and was transferred to the hospital here to psych zaragoza   for further management and evaluation. Currently, the patient is resting   in bed. Denies any complaints or problems. Denies any suicidal   ideation. Denies any headache, blurry vision, sore throat, trouble   swallowing, trouble with speech, any chest pain, shortness of breath,   cough, fever, chills, abdominal pain, constipation, diarrhea,   hematemesis, melena, hemoptysis, urinary symptoms, focal or   generalized neurological weakness, recent travels or sick contacts. PAST MEDICAL HISTORY: See above. HOME MEDICATIONS: None. SOCIAL HISTORY: The patient has history of drug abuse, was found   to have a urine drug screen positive for marijuana, benzo's,   barbiturates, opiates, methamphetamine and cocaine. Denies tobacco   abuse or alcohol use. ALLERGIES TO: Caretha Edward. FAMILY HISTORY: Was discussed, was found to be noncontributory. PHYSICAL EXAMINATION   VITAL SIGNS: Temperature 97.5, , respiratory rate 16, blood   pressure , pulse oximetry 98% on room air. GENERAL: Alert x3, flat affect, resting in bed, pleasant female,   appears to be stated age. HEENT: Pupils equal and reactive to light. Dry mucous membranes. There are areas of superficial bruising, but no pain associated with the   same on the face. NECK: Supple. CHEST: Clear to auscultation bilaterally. CORONARY: S1, S2 were heard. ABDOMEN: Soft, nontender, nondistended. Bowel sounds physiologic. EXTREMITIES: No clubbing, no cyanosis, no edema. NEUROPSYCHIATRIC: Flat affect. Moves all 4. Strength 5/5. No gross   focal neurological deficits were noted. LABORATORY DATA: Labs [place] [facility] [place] show hemoglobin 13.4, hematocrit 37.3, platelets of 765. UA   with no acute infection. Sodium 136, potassium 3.8, chloride 112,   bicarbonate 21, anion gap 10, glucose 76, BUN 12, creatinine 0.7,   calcium 9.1, albumin 4.1, total bilirubin 1.1, , ALT 12. ASSESSMENT AND PLAN   1. Suicidal ideation.  The patient has been admitted to the psych floor,   and will follow along with Psychiatry. Continue to monitor. Plan per   primary. 2. Mild elevation in bilirubin. Will recheck blood levels. The patient   denies any complaints or problems at this point of time. 3. Gastrointestinal and deep venous thrombosis prophylaxis per unit   protocol. Charlie Hooker MD MM / Bob Scherer   D:  05/09/2017   15:43   T:  05/09/2017   16:08   Job #:  [id number]  [MM1.1]   Revision History       User Key Date/Time User Provider Type Action    > MM1.1 05/09/17 1611 Jamil Cruz MD Physician Edit            H&P by Comfort Hopkins MD at 05/09/17 1446     Author:  Comfort Hopkins MD Service:  PSYCHIATRY Author Type:  Physician    Filed:  05/09/17 5215 Date of Service:  05/09/17 1446 Status:  Signed    :  Comfort Hopkins MD (Physician)             INITIAL PSYCHIATRIC EVALUATION         IDENTIFICATION:    Patient Name  Kathy Casarez   Date of Birth 1980   [facility] [id number]   Medical Record Number  [id number]      Age  29 y.o. PCP PROVIDER UNKNOWN   Admit date:  5/9/2017    Room Number  730/01  @ [facility]   Date of Service  5/9/2017            HISTORY         REASON FOR HOSPITALIZATION:  CC: \"SI and depression \". Pt admitted under a voluntary basis for severe depression with suicidal ideations  proving to be/posing an imminent danger to self and an inability to care for self. HISTORY OF PRESENT ILLNESS:    The patient, Kathy Casarez, is a 29 y.o. UNKNOWN female with a past psychiatric history significant for polysubstance dependence, who presents at this time with complaints of (and/or evidence of) the following emotional symptoms: agitation. Additional symptomatology include feeling suicidal.  The above symptoms have been present for 2 days . These symptoms are of severe severity. These symptoms are constant in nature.   The patient's condition has been precipitated by loosing custody or her children and psychosocial stressors (conflicts with the children's father ). Patient's condition made worse by continued illicit drug use and alcohol use as well as treatment noncompliance. UDS: +MJ, BZP, BARBS, OPI, METHAMPH, AMPH, Cocaine and PCP BAL=0. ALLERGIES:  Allergies   Allergen Reactions    Celexa [Citalopram] Unknown (comments)      MEDICATIONS PRIOR TO ADMISSION:  No prescriptions prior to admission. PAST MEDICAL HISTORY:  No past medical history on file. No past surgical history on file. SOCIAL HISTORY:    Social History     Social History    Marital status: UNKNOWN     Spouse name: N/A    Number of children: N/A    Years of education: N/A     Occupational History    Not on file. Social History Main Topics    Smoking status: Not on file    Smokeless tobacco: Not on file    Alcohol use Not on file    Drug use: Not on file    Sexual activity: Not on file     Other Topics Concern    Not on file     Social History Narrative    29year old  female admitted voluntarily for severe depression in the context of polysubstance dependence and loss of custody of her 3 children. Patient and her children's' father have been fighting the legal system for 4 years now, in order to regain custody of their children, which CPS has removed from their home. The pat. And her boyfriend, have been unable to sustain gainful employment not to abstain from illicit drugs. The Pt's UDS was pan positive for methamphetamines, THC, Opiates, barbiturates, cocaine, and benzodiazepines. Pt reported to the ED after an argument with her children's father, after which she attempted suicide by cutting he left wrist, requiring sutures. She denies any past hx of psychiatric treatment. FAMILY HISTORY:   No family history on file.     REVIEW OF SYSTEMS:   Psychological ROS: positive for - behavioral disorder  Respiratory ROS: no cough, shortness of breath, or wheezing  Cardiovascular ROS: no chest pain or dyspnea on exertion  Pertinent items are noted in the History of Present Illness. All other Systems reviewed and are considered negative. MENTAL STATUS EXAM & VITALS         MENTAL STATUS EXAM (MSE):    MSE FINDINGS ARE WITHIN NORMAL LIMITS (WNL) UNLESS OTHERWISE STATED BELOW. ( ALL OF THE BELOW CATEGORIES OF THE MSE HAVE BEEN REVIEWED (reviewed 5/9/2017) AND UPDATED AS DEEMED APPROPRIATE )  General Presentation age appropriate, evasive and guarded   Orientation oriented to time, place and person   Vital Signs  See below (reviewed 5/9/2017); Vital Signs (BP, Pulse, & Temp) are within normal limits if not listed below. Gait and Station Stable/steady, no ataxia   Musculoskeletal System No extrapyramidal symptoms (EPS); no abnormal muscular movements or Tardive Dyskinesia (TD); muscle strength and tone are within normal limits   Language No aphasia or dysarthria   Speech:  normal pitch   Thought Processes logical; normal rate of thoughts; poor abstract reasoning/computation   Thought Associations circumstantial   Thought Content free of delusions   Suicidal Ideations Cut left wrist requiring sutures   Homicidal Ideations none   Mood:  irritable   Affect:  constricted   Memory recent  fair   Memory remote:  fair   Concentration/Attention:  distractable   Fund of Knowledge below average   Insight:  poor   Reliability poor   Judgment:  poor            VITALS:     Patient Vitals for the past 24 hrs:   Temp Pulse Resp BP SpO2   05/09/17 1200 97.4 °F (36.3 °C) 84 16 125/85 98 %   05/09/17 0728 98.4 °F (36.9 °C) 81 16 113/78 99 %   05/09/17 0600 98.1 °F (36.7 °C) 93 18 130/89 100 %     Wt Readings from Last 3 Encounters:   No data found for Wt     Temp Readings from Last 3 Encounters:   05/09/17 97.4 °F (36.3 °C)     BP Readings from Last 3 Encounters:   05/09/17 125/85     Pulse Readings from Last 3 Encounters:   05/09/17 84            DATA       LABORATORY DATA:  Labs Reviewed - No data to display  No results found for any previous visit.      RADIOLOGY REPORTS:  No results found for this or any previous visit. No results found.            MEDICATIONS       ALL MEDICATIONS  Current Facility-Administered Medications   Medication Dose Route Frequency    ziprasidone (GEODON) 20 mg in sterile water (preservative free) 1 mL injection  20 mg IntraMUSCular BID PRN    OLANZapine (ZyPREXA) tablet 5 mg  5 mg Oral Q6H PRN    benztropine (COGENTIN) tablet 2 mg  2 mg Oral BID PRN    benztropine (COGENTIN) injection 2 mg  2 mg IntraMUSCular BID PRN    LORazepam (ATIVAN) injection 2 mg  2 mg IntraMUSCular Q4H PRN    zolpidem (AMBIEN) tablet 10 mg  10 mg Oral QHS PRN    acetaminophen (TYLENOL) tablet 650 mg  650 mg Oral Q4H PRN    ibuprofen (MOTRIN) tablet 400 mg  400 mg Oral Q8H PRN    magnesium hydroxide (MILK OF MAGNESIA) 400 mg/5 mL oral suspension 30 mL  30 mL Oral DAILY PRN    nicotine (NICODERM CQ) 21 mg/24 hr patch 1 Patch  1 Patch TransDERmal DAILY PRN    folic acid (FOLVITE) tablet 1 mg  1 mg Oral DAILY    thiamine (B-1) tablet 100 mg  100 mg Oral DAILY    LORazepam (ATIVAN) injection 2 mg  2 mg IntraMUSCular Q1H PRN    LORazepam (ATIVAN) injection 4 mg  4 mg IntraMUSCular Q1H PRN    [START ON 5/10/2017] methadone (DOLOPHINE) tablet 15 mg  15 mg Oral DAILY    promethazine (PHENERGAN) tablet 25 mg  25 mg Oral Q6H PRN    loperamide (IMODIUM) capsule 2 mg  2 mg Oral PRN    dicyclomine (BENTYL) 10 mg/mL injection 20 mg  20 mg IntraMUSCular Q6H PRN    hydrOXYzine HCl (ATARAX) tablet 50 mg  50 mg Oral Q4H PRN    methocarbamol (ROBAXIN) tablet 500 mg  500 mg Oral BID PRN      SCHEDULED MEDICATIONS  Current Facility-Administered Medications   Medication Dose Route Frequency    folic acid (FOLVITE) tablet 1 mg  1 mg Oral DAILY    thiamine (B-1) tablet 100 mg  100 mg Oral DAILY    [START ON 5/10/2017] methadone (DOLOPHINE) tablet 15 mg  15 mg Oral DAILY                ASSESSMENT & PLAN        The patient Susanne Brooke is a 29 y.o.  female who presents at this time for treatment of the following diagnoses:  Patient Active Hospital Problem List:   Bipolar disorder ([facility] (5/9/2017)    Assessment:[MH1.1] by history. No evidence of hypomania or jorge on admission[MH1.2]    Plan:[MH1.1] Mental state is quite likely due to protracted and severe polyunbstance dependence[MH1.2]   Polysubstance dependence ([facility] (5/9/2017)    Assessmen[MH1.1]t ;daily use of severeal illicit substances with tolerance and withdrwal[MH1.2]    Plan:[MH1.1] methadone taper, CIWA w/ prm ativan[MH1.2]          In summary, Isbael Monzon presents with a severe exacerbation of the principal diagnosis, Bipolar disorder ([facility]    While on the unit Isabel Monzon will be provided with individual, milieu, occupational, group, and substance abuse therapies to address target symptoms as deemed appropriate for the individual patient. ill continue to monitor blood levels (Depakote, Tegretol, lithium, clozapine---a drug with a narrow therapeutic index= NTI) and associated labs for drug therapy implemented that require intense monitoring for toxicity as deemed appropriate base on current medication side effects and pharmacodynamically determined drug 1/2 lives. I agree with decision to admit patient. I have spoken to [facility] psychiatric /ED staff regarding the nature of patients's admission at this time. A coordinated, multidisplinary treatment team (includes the nurse, unit pharmcist,  and writer) round was conducted for this initial evaluation with the patient present. The following regarding medications was addressed during rounds with patient:   the risks and benefits of the proposed medication. The patient was given the opportunity to ask questions. Informed consent given to the use of the above medications.      I will continue to adjust psychiatric and non-psychiatric medications (see above \"medication\" section and orders section for details) as deemed appropriate & based upon diagnoses and response to treatment. I have reviewed admission (and previous/old) labs and medical tests in the EHR and or transferring hospital documents. I will continue to order blood tests/labs and diagnostic tests as deemed appropriate and review results as they become available (see orders for details). I have reviewed old psychiatric and medical records available in the EHR. I Will order additional psychiatric records from other institutions to further elucidate the nature of patient's psychopathology and review once available. I will gather additional collateral information from friends, family and o/p treatment team to further elucidate the nature of patient's psychopathology and baselline level of psychiatric functioning.         ESTIMATED LENGTH OF STAY:[MH1.1]    3-5[MH1.2] days       STRENGTHS:[MH1.1]  Awareness of Substance abuse issues and Motivated and ready for change[MH1.2]                      SIGNED:    Elizabeth Alvarez MD  5/9/2017[MH1.1]                  Revision History       User Key Date/Time User Provider Type Action    > MH1.2 05/09/17 1517 Elizabeth Alvarez MD Physician Sign     MH1.1 05/09/17 1717 [facility] [facility] Shane MD Physician               Admission Diagnosis: bipolar D/O  Bipolar disorder ([facility]    * No surgery found *    Results for orders placed or performed during the hospital encounter of 05/09/17   CBC W/O DIFF   Result Value Ref Range    WBC 9.3 3.6 - 11.0 K/uL    RBC 4.15 3.80 - 5.20 M/uL    HGB 12.8 11.5 - 16.0 g/dL    HCT 37.7 35.0 - 47.0 %    MCV 90.8 80.0 - 99.0 FL    MCH 30.8 26.0 - 34.0 PG    MCHC 34.0 30.0 - 36.5 g/dL    RDW 12.8 11.5 - 14.5 %    PLATELET 169 405 - 457 K/uL   METABOLIC PANEL, COMPREHENSIVE   Result Value Ref Range    Sodium 136 136 - 145 mmol/L    Potassium 4.2 3.5 - 5.1 mmol/L    Chloride 101 97 - 108 mmol/L    CO2 30 21 - 32 mmol/L    Anion gap 5 5 - 15 mmol/L    Glucose 82 65 - 100 mg/dL    BUN 14 6 - 20 MG/DL    Creatinine 0.69 0.55 - 1.02 MG/DL BUN/Creatinine ratio 20 12 - 20      GFR est AA >60 >60 ml/min/1.73m2    GFR est non-AA >60 >60 ml/min/1.73m2    Calcium 9.1 8.5 - 10.1 MG/DL    Bilirubin, total 0.8 0.2 - 1.0 MG/DL    ALT (SGPT) 16 12 - 78 U/L    AST (SGOT) 8 (L) 15 - 37 U/L    Alk. phosphatase 74 45 - 117 U/L    Protein, total 7.1 6.4 - 8.2 g/dL    Albumin 3.5 3.5 - 5.0 g/dL    Globulin 3.6 2.0 - 4.0 g/dL    A-G Ratio 1.0 (L) 1.1 - 2.2     PROTHROMBIN TIME + INR   Result Value Ref Range    INR 1.0 0.9 - 1.1      Prothrombin time 9.9 9.0 - 11.1 sec   GLUCOSE, FASTING   Result Value Ref Range    Glucose 82 65 - 100 MG/DL   LIPID PANEL   Result Value Ref Range    LIPID PROFILE          Cholesterol, total 131 <200 MG/DL    Triglyceride 101 <150 MG/DL    HDL Cholesterol 63 MG/DL    LDL, calculated 47.8 0 - 100 MG/DL    VLDL, calculated 20.2 MG/DL    CHOL/HDL Ratio 2.1 0 - 5.0         Immunizations administered during this encounter: There is no immunization history on file for this patient. Screening for Metabolic Disorders for Patients on Antipsychotic Medications    Estimated Body Mass Index  There is no height or weight on file to calculate BMI.      Vital Signs/Blood Pressure  Visit Vitals    /74    Pulse 72    Temp 98.7 °F (37.1 °C)    Resp 16    SpO2 100%    Breastfeeding No       Blood Glucose/Hemoglobin A1c  Lab Results   Component Value Date/Time    Glucose 82 05/10/2017 06:54 AM    Glucose 82 05/10/2017 06:54 AM        No results found for: HBA1C, HGBE8, KTP0ZRBG     Lipid Panel  Lab Results   Component Value Date/Time    Cholesterol, total 131 05/10/2017 06:54 AM    HDL Cholesterol 63 05/10/2017 06:54 AM    LDL, calculated 47.8 05/10/2017 06:54 AM    Triglyceride 101 05/10/2017 06:54 AM    CHOL/HDL Ratio 2.1 05/10/2017 06:54 AM       Discharge Diagnosis: Bipolar DX and  Polysubstance dependence    Discharge Plan: DISCHARGE SUMMARY    Evelin Li  : 1980  MRN: [id number]    The patient Karla Goodell exhibits the ability to control behavior in a less restrictive environment. Patient's level of functioning is improving. No assaultive/destructive behavior has been observed for the past 24 hours. No suicidal/homicidal threat or behavior has been observed for the past 24 hours. There is no evidence of serious medication side effects. Patient has not been in physical or protective restraints for at least the past 24 hours. If weapons involved, how are they secured? No weapons involved     Is patient aware of and in agreement with discharge plan? Patient is aware of discharge and is in agreement     Arrangements for medication: . Pt was given scripts    Patient is a smoker and needs referral for smoking cessation? Patient declined   1. Patient referred to the following for smoking cessation with an appointment: Patient declined   2. Patient was offered medication to assist with smoking cessation at discharge: Patient declined   3. Education for smoking cessation added to discharge instructions:     Patient has a history of substance/alcohol abuse and requires a referral for treatment ? yes  1. Patient referred to the following for substance/alcohol abuse treatment with an appointment:   Friday May 15 at 8:30 with [facility]   2. Patient was offered medication to assist with alcohol cessation at discharge:  no  3. Education for substance/alcohol abuse added to discharge instructions:     Copy of discharge instructions to  provider?:  Yes, faxed to [phone]   Arrangements for transportation home:  Hospital purchased  a bus ticket  [address]- no   Name of Decision maker if patient has Psychiatric Care Directive   Patient was offered information  -  patient declined information. Keep all follow up appointments as scheduled, continue to take prescribed medications per physician instructions.   Mental health crisis number:  532 or your local mental health crisis line number at [phone]      Discharge Medication List and Instructions:   Current Discharge Medication List      START taking these medications    Details   hydrOXYzine HCl (ATARAX) 50 mg tablet Take 1 Tab by mouth every four (4) hours as needed (Anxiety) for up to 10 days. Indications: anxiety  Qty: 90 Tab, Refills: 9             Unresulted Labs     None        To obtain results of studies pending at discharge, please contact [phone]    Follow-up Information     Follow up With Details Comments [address]   On 5/15/2017 you need to walk in Monday - Friday 8:30 to 3:00 for intake  [address]   [phone]   fax [phone]     Provider Unknown   Patient not available to ask            Advanced Care Plan:   Confirm Advance Directive: None     Patient Would Like to Complete Advance Directive: No             Patient Instructions: Please continue all medications until otherwise directed by physician. Tobacco Cessation Discharge Plan:   Is the patient a smoker and needs referral for smoking cessation? Declined  Patient referred to the following for smoking cessation with an appointment? Refused  Patient was offered medication to assist with smoking cessation at discharge? Refused  Was education for smoking cessation added to the discharge instructions? Yes but refused    Alcohol/Substance Abuse Discharge Plan:   Does the patient have a history of substance/alcohol abuse and requires a referral for treatment? Yes  Patient referred to the following for substance/alcohol abuse treatment with an appointment? Yes 5/15/2017 @ 8:30am with [facility]  Patient was offered medication to assist with alcohol cessation at discharge? No  Was education for substance/alcohol abuse added to discharge instructions?      Patient discharged to [facility]